# Patient Record
Sex: FEMALE | Race: WHITE | NOT HISPANIC OR LATINO | Employment: FULL TIME | URBAN - NONMETROPOLITAN AREA
[De-identification: names, ages, dates, MRNs, and addresses within clinical notes are randomized per-mention and may not be internally consistent; named-entity substitution may affect disease eponyms.]

---

## 2023-03-16 ENCOUNTER — TELEMEDICINE (OUTPATIENT)
Dept: PRIMARY CARE | Facility: CLINIC | Age: 58
End: 2023-03-16
Payer: COMMERCIAL

## 2023-03-16 ENCOUNTER — TELEPHONE (OUTPATIENT)
Dept: PRIMARY CARE | Facility: CLINIC | Age: 58
End: 2023-03-16

## 2023-03-16 DIAGNOSIS — U07.1 COVID-19 VIRUS DETECTED: Primary | ICD-10-CM

## 2023-03-16 DIAGNOSIS — M79.605 LEG PAIN, BILATERAL: ICD-10-CM

## 2023-03-16 DIAGNOSIS — M79.604 LEG PAIN, BILATERAL: ICD-10-CM

## 2023-03-16 DIAGNOSIS — J45.30 MILD PERSISTENT ASTHMA WITHOUT COMPLICATION (HHS-HCC): ICD-10-CM

## 2023-03-16 PROBLEM — E61.1 IRON DEFICIENCY: Status: ACTIVE | Noted: 2023-03-16

## 2023-03-16 PROBLEM — Z86.59 HISTORY OF CLAUSTROPHOBIA: Status: ACTIVE | Noted: 2023-03-16

## 2023-03-16 PROBLEM — E78.00 ELEVATED LDL CHOLESTEROL LEVEL: Status: ACTIVE | Noted: 2023-03-16

## 2023-03-16 PROBLEM — E53.8 VITAMIN B12 DEFICIENCY: Status: ACTIVE | Noted: 2023-03-16

## 2023-03-16 PROBLEM — R03.0 ELEVATED BLOOD PRESSURE READING: Status: ACTIVE | Noted: 2023-03-16

## 2023-03-16 PROBLEM — J45.909 ASTHMA (HHS-HCC): Status: ACTIVE | Noted: 2023-03-16

## 2023-03-16 PROBLEM — F41.9 ANXIETY: Status: ACTIVE | Noted: 2023-03-16

## 2023-03-16 PROBLEM — K20.0 EOSINOPHILIC ESOPHAGITIS: Status: ACTIVE | Noted: 2023-03-16

## 2023-03-16 PROBLEM — J30.9 ALLERGIC RHINITIS: Status: ACTIVE | Noted: 2023-03-16

## 2023-03-16 PROBLEM — Z86.718 HISTORY OF DVT (DEEP VEIN THROMBOSIS): Status: ACTIVE | Noted: 2023-03-16

## 2023-03-16 PROBLEM — R00.2 PALPITATIONS: Status: ACTIVE | Noted: 2023-03-16

## 2023-03-16 PROBLEM — K21.9 GERD (GASTROESOPHAGEAL REFLUX DISEASE): Status: ACTIVE | Noted: 2023-03-16

## 2023-03-16 PROBLEM — H54.61 VISION LOSS OF RIGHT EYE: Status: ACTIVE | Noted: 2023-03-16

## 2023-03-16 PROBLEM — G43.909 MIGRAINE HEADACHE: Status: ACTIVE | Noted: 2023-03-16

## 2023-03-16 PROBLEM — R76.8 POSITIVE ANA (ANTINUCLEAR ANTIBODY): Status: ACTIVE | Noted: 2023-03-16

## 2023-03-16 PROBLEM — R00.1 BRADYCARDIA: Status: ACTIVE | Noted: 2023-03-16

## 2023-03-16 PROCEDURE — 99213 OFFICE O/P EST LOW 20 MIN: CPT | Performed by: FAMILY MEDICINE

## 2023-03-16 RX ORDER — HYDROXYZINE HYDROCHLORIDE 10 MG/1
10 TABLET, FILM COATED ORAL
COMMUNITY
Start: 2018-08-28

## 2023-03-16 RX ORDER — OMEPRAZOLE 20 MG/1
20 CAPSULE, DELAYED RELEASE ORAL
COMMUNITY
Start: 2022-03-23 | End: 2023-04-05 | Stop reason: ALTCHOICE

## 2023-03-16 RX ORDER — IBUPROFEN 600 MG/1
200 TABLET ORAL
COMMUNITY
Start: 2022-12-09

## 2023-03-16 RX ORDER — FLUTICASONE FUROATE AND VILANTEROL TRIFENATATE 100; 25 UG/1; UG/1
1 POWDER RESPIRATORY (INHALATION) DAILY
Qty: 3 EACH | Refills: 1 | Status: SHIPPED
Start: 2023-03-16 | End: 2023-07-29

## 2023-03-16 RX ORDER — CHOLECALCIFEROL (VITAMIN D3) 50 MCG
2000 TABLET ORAL DAILY
COMMUNITY

## 2023-03-16 RX ORDER — FLUTICASONE FUROATE AND VILANTEROL TRIFENATATE 100; 25 UG/1; UG/1
1 POWDER RESPIRATORY (INHALATION) DAILY
COMMUNITY
Start: 2022-03-23 | End: 2023-03-16 | Stop reason: SDUPTHER

## 2023-03-16 ASSESSMENT — PATIENT HEALTH QUESTIONNAIRE - PHQ9
1. LITTLE INTEREST OR PLEASURE IN DOING THINGS: NOT AT ALL
SUM OF ALL RESPONSES TO PHQ9 QUESTIONS 1 AND 2: 0
2. FEELING DOWN, DEPRESSED OR HOPELESS: NOT AT ALL

## 2023-03-16 NOTE — TELEPHONE ENCOUNTER
Onset of Covid illness- 3/12  Date & Location of positive covid 19 test-3/12  Home   Vaccinated for covid 19- no  Current Symptoms  - leg pain, ha,     I have asked patient and they report they have NO increased SOB, No CP, No confusion, No change in skin color ( blue or gray).    Provider if you can please review patient's chart and medical history and provide detailed instructions on next steps in this task.   Thank you     Basically, she just wants to touch base with you for advise to make sure she is doing what she needs to do.    Refill on Breo for her asthma

## 2023-03-16 NOTE — TELEPHONE ENCOUNTER
Please schedule patient this afternoon on my schedule as a VV, need to ask more questions regarding leg pain

## 2023-03-16 NOTE — PROGRESS NOTES
Subjective   Patient ID: Elda Anne is a 57 y.o. female who presents for Leg Pain and Covid-19 Home Monitoring Phone Call.    HPI   Patient tested positive for Covid on 3/13/23 (2nd occurrence, last was 2 years ago)  Symptoms started on 3/12/23 evening with bilateral leg aching and fever/chills.  Highest temp was 100.7F, no fever the last 24 hours.  +h/o asthma and h/o DVT at age 38 when traveling in [place] (does not routinely take a blood thinner)  Took 3 home tests and all 3 were positive  Took Tylenol and it alleviated her leg pain, pain is located from mid thigh to right below knees.   Pain is better than it was last night  Denies chest pain or shortness of breath (does use Breo for asthma)  Denies leg redness, swelling or warmth, NTTP  She states she has been mobile and moving around the house, not laying bed all day  Has never taken Paxlovid      Review of Systems  See HPI    Objective   There were no vitals taken for this visit.  Unable to obtain VS during this visit    Physical Exam  Constitutional: Well developed, well nourished, alert and in no acute distress   Eyes: Normal external exam.   Pulmonary: No respiratory distress, speaks comfortably in complete sentences.  Neurologic: Cranial nerves II-XII grossly intact. Answers questions appropriately.   Psychiatric: Mood calm and affect normal.    Assessment/Plan   We discussed supportive care at this time: hydration, moving around the house, nasal saline rinses 2-3x/day, intranasal Flonase    May take Tylenol 1000mg WITH Ibuprofen 800mg every 8 hours together as needed for jose luis, up to 5 days. Take with food.    We discussed the benefits, risks and potential adverse effects of Paxlovid.    Please closely  monitor your leg pain, if your pain worsens, you develop swelling, redness, warmth, chest pain or shortness of breath, please proceed to the ED immediately.

## 2023-04-05 ENCOUNTER — OFFICE VISIT (OUTPATIENT)
Dept: PRIMARY CARE | Facility: CLINIC | Age: 58
End: 2023-04-05
Payer: COMMERCIAL

## 2023-04-05 VITALS
OXYGEN SATURATION: 98 % | RESPIRATION RATE: 14 BRPM | SYSTOLIC BLOOD PRESSURE: 156 MMHG | DIASTOLIC BLOOD PRESSURE: 68 MMHG | HEART RATE: 55 BPM | WEIGHT: 164.9 LBS | TEMPERATURE: 98 F | HEIGHT: 67 IN | BODY MASS INDEX: 25.88 KG/M2

## 2023-04-05 DIAGNOSIS — M79.605 LEG PAIN, POSTERIOR, LEFT: Primary | ICD-10-CM

## 2023-04-05 DIAGNOSIS — E66.3 OVERWEIGHT WITH BODY MASS INDEX (BMI) 25.0-29.9: ICD-10-CM

## 2023-04-05 DIAGNOSIS — R03.0 ELEVATED BLOOD PRESSURE READING: ICD-10-CM

## 2023-04-05 PROCEDURE — 99213 OFFICE O/P EST LOW 20 MIN: CPT | Performed by: FAMILY MEDICINE

## 2023-04-05 PROCEDURE — 1036F TOBACCO NON-USER: CPT | Performed by: FAMILY MEDICINE

## 2023-04-05 ASSESSMENT — PATIENT HEALTH QUESTIONNAIRE - PHQ9
2. FEELING DOWN, DEPRESSED OR HOPELESS: NOT AT ALL
1. LITTLE INTEREST OR PLEASURE IN DOING THINGS: NOT AT ALL
SUM OF ALL RESPONSES TO PHQ9 QUESTIONS 1 AND 2: 0

## 2023-04-05 NOTE — PROGRESS NOTES
"Subjective   Patient ID: Elda Anne is a 57 y.o. female who presents for Foot Pain (Left foot x4-5 days. No known injury. ).    HPI   Since Saturday, she can't put pressure on her left foot  Pain is located on lateral dorsal foot  Had cramping on plantar aspect of foot  Denies swelling  Has h/o DVT in LLE (has permanent s/s)  She does have pain in posterior left knee  No GRACIE  Recently had covid  No change in activity  She is not currently taking blood thinners  Having difficulty ambulating due to pain  She did take off work yesterday and elevated/rested her foot/leg and is somewhat improved today   No recent travel  No redness or warmth to the leg, but never had this with initial clot  Started wearing compression stockings again the last few days    Review of Systems  See HPI    Objective   /69 (BP Location: Left arm, Patient Position: Sitting, BP Cuff Size: Adult)   Pulse 55   Temp 36.7 °C (98 °F) (Temporal)   Resp 14   Ht 1.702 m (5' 7\")   Wt 74.8 kg (164 lb 14.5 oz)   SpO2 98%   BMI 25.83 kg/m²     Physical Exam  Constitutional: Well developed, well nourished, alert and in no acute distress   Eyes: Normal external exam.   Cardiovascular: Regular rate and rhythm, normal S1 and S2, no murmurs, gallops, or rubs. Radial pulses normal. No peripheral edema.  Pulmonary: No respiratory distress, lungs clear to auscultation bilaterally. No wheezes, rhonchi, rales.  Skin: Warm, well perfused, normal skin turgor and color. No rashes or discoloration.  Neurologic: Cranial nerves II-XII grossly intact.   Psychiatric: Mood calm and affect normal.  LLE: negative calf squeeze, but pain is located posteriorlaterally, worse with leg extension, no edema/erythema or warmth  Left Foot: Mild discomfort with resisted plantarflexion, none to resisted dorsiflexion, no ecchymosis, NTTP but pain is located on lateral dorsal aspect of foot and around lateral malleolus, no edema. Normal ankle ROM.   Patient ambulated " cautiously and with a limp    Assessment/Plan   STAT venous US LLE to r/o DVT-we will contact you with results    Work excuse provided    Please rest and ice your foot, ice for 15 minutes 2-3x/day, may need MRI in future or to wear a boot for comfort the next few days.  Avoid high impact or strenuous activity over the next 7 days.  Consider taking an NSAID OTC as directed on the bottle for pain/inflammation.

## 2023-04-05 NOTE — LETTER
April 5, 2023     Patient: Elda Anne   YOB: 1965   Date of Visit: 4/5/2023       To Whom It May Concern:    Elda Anne was seen in my clinic on 4/5/2023 at 10:00 am. Please excuse Elda for her absence from work on this day to make the appointment and tomorrow, 4/6/2023.    If you have any questions or concerns, please don't hesitate to call.         Sincerely,         Sole Womack,         CC: No Recipients

## 2023-04-14 ENCOUNTER — LAB (OUTPATIENT)
Dept: LAB | Facility: LAB | Age: 58
End: 2023-04-14
Payer: COMMERCIAL

## 2023-04-14 DIAGNOSIS — M25.572 ACUTE LEFT ANKLE PAIN: ICD-10-CM

## 2023-04-14 DIAGNOSIS — M79.672 LEFT FOOT PAIN: ICD-10-CM

## 2023-04-14 DIAGNOSIS — M79.672 LEFT FOOT PAIN: Primary | ICD-10-CM

## 2023-04-14 PROCEDURE — 36415 COLL VENOUS BLD VENIPUNCTURE: CPT

## 2023-04-14 PROCEDURE — 85652 RBC SED RATE AUTOMATED: CPT

## 2023-04-14 PROCEDURE — 84550 ASSAY OF BLOOD/URIC ACID: CPT

## 2023-04-15 DIAGNOSIS — M79.605 LEG PAIN, POSTERIOR, LEFT: ICD-10-CM

## 2023-04-15 DIAGNOSIS — R03.0 ELEVATED BLOOD PRESSURE READING: Primary | ICD-10-CM

## 2023-04-15 DIAGNOSIS — M79.672 FOOT PAIN, LEFT: ICD-10-CM

## 2023-04-15 LAB
SEDIMENTATION RATE, ERYTHROCYTE: 27 MM/H (ref 0–30)
URATE (MG/DL) IN SER/PLAS: 5 MG/DL (ref 2.3–6.7)

## 2023-06-20 ENCOUNTER — OFFICE (OUTPATIENT)
Dept: URBAN - METROPOLITAN AREA CLINIC 27 | Facility: CLINIC | Age: 58
End: 2023-06-20

## 2023-06-20 VITALS
WEIGHT: 170 LBS | SYSTOLIC BLOOD PRESSURE: 150 MMHG | HEIGHT: 67 IN | DIASTOLIC BLOOD PRESSURE: 63 MMHG | HEART RATE: 55 BPM | TEMPERATURE: 97 F

## 2023-06-20 DIAGNOSIS — K21.9 GASTRO-ESOPHAGEAL REFLUX DISEASE WITHOUT ESOPHAGITIS: ICD-10-CM

## 2023-06-20 DIAGNOSIS — Z12.11 ENCOUNTER FOR SCREENING FOR MALIGNANT NEOPLASM OF COLON: ICD-10-CM

## 2023-06-20 DIAGNOSIS — R13.10 DYSPHAGIA, UNSPECIFIED: ICD-10-CM

## 2023-06-20 PROCEDURE — 99204 OFFICE O/P NEW MOD 45 MIN: CPT | Performed by: INTERNAL MEDICINE

## 2023-07-28 NOTE — PROGRESS NOTES
Subjective   Patient ID: Elda Anne is a 58 y.o. female who presents for discuss asthma treatment (Breo- 1 time a day /Albuterol- 4-5 times a week, 1 puff at a time ) and Arm Pain (Excruciating, burning pain,).    HPI     Patient of Sole Womack DO    Patient here for evaluation of asthma.  Currently prescribe Breo Ellipta.    Former smoker and quit about 35 years ago.   Smoked an occasional cigarette during teenage years to early twenties.  Smoked for about 5 years.   No personal history of cancer.   Father had lung cancer.      10/2022 CACS showed calcified granulomata within the left lower lobe and left josseline. No gross evidence of mediastinal or hilar lymphadenopathy or masses is identified. The visualized segments of the lungs are normally  expanded.    Saw [facility] on 12/2022 and was recommended she have CT chest and pulmonology evaluation. CT chest ordered but do not see this was completed.  Pulmonology referral placed 12/2022    No prior PFTs in our records.        Review of Systems   All other systems reviewed and are negative.      Objective   /72 (BP Location: Right arm, Patient Position: Sitting, BP Cuff Size: Small adult)   Pulse 53   Temp 36.7 °C (98.1 °F) (Temporal)   Resp 14   Wt 78.7 kg (173 lb 9.6 oz)   SpO2 97%   BMI 27.19 kg/m²     Physical Exam  Vitals and nursing note reviewed.   Constitutional:       General: She is not in acute distress.     Appearance: Normal appearance. She is not toxic-appearing.   HENT:      Head: Normocephalic and atraumatic.   Eyes:      Extraocular Movements: Extraocular movements intact.      Pupils: Pupils are equal, round, and reactive to light.   Neck:      Thyroid: No thyromegaly.      Vascular: No hepatojugular reflux or JVD.   Cardiovascular:      Rate and Rhythm: Normal rate and regular rhythm.      Heart sounds: No murmur heard.     No friction rub. No gallop.   Pulmonary:      Effort: Pulmonary effort is normal.      Breath sounds: Normal breath  sounds. No wheezing, rhonchi or rales.   Abdominal:      General: Bowel sounds are normal. There is no distension.      Palpations: Abdomen is soft. There is no mass.      Tenderness: There is no abdominal tenderness. There is no guarding.   Musculoskeletal:      Right lower leg: No edema.      Left lower leg: No edema.   Lymphadenopathy:      Cervical: No cervical adenopathy.   Skin:     General: Skin is warm and dry.   Neurological:      General: No focal deficit present.      Mental Status: She is alert and oriented to person, place, and time.   Psychiatric:         Mood and Affect: Mood normal.         Behavior: Behavior normal.         Assessment/Plan   Diagnoses and all orders for this visit:  Asthma, unspecified asthma severity, unspecified whether complicated, unspecified whether persistent  -     fluticasone propion-salmeteroL (Advair Diskus) 250-50 mcg/dose diskus inhaler; Inhale 1 puff 2 times a day. Rinse mouth with water after use to reduce aftertaste and incidence of candidiasis. Do not swallow.  -     Pulmonary function testing (Ancillary Performed); Future  -     Referral to Pulmonology; Future  -     montelukast (Singulair) 10 mg tablet; Take 1 tablet (10 mg) by mouth once daily at bedtime.  Elevated blood pressure reading  Allergic rhinitis, unspecified seasonality, unspecified trigger      Follow-up in  6 - 8 weeks with me for recheck bp,   review pulmonary function test      Follow up left arm pain next avail acute     Please make wellness exam with Dr Womack in Oct/ Nov, will need labs before that to review       Follow-up in * for routine care.  Call for sooner follow-up if needed.

## 2023-07-29 ENCOUNTER — OFFICE VISIT (OUTPATIENT)
Dept: PRIMARY CARE | Facility: CLINIC | Age: 58
End: 2023-07-29
Payer: COMMERCIAL

## 2023-07-29 VITALS
TEMPERATURE: 98.1 F | HEART RATE: 53 BPM | OXYGEN SATURATION: 97 % | RESPIRATION RATE: 14 BRPM | DIASTOLIC BLOOD PRESSURE: 72 MMHG | BODY MASS INDEX: 27.19 KG/M2 | WEIGHT: 173.6 LBS | SYSTOLIC BLOOD PRESSURE: 150 MMHG

## 2023-07-29 DIAGNOSIS — R76.8 POSITIVE ANA (ANTINUCLEAR ANTIBODY): ICD-10-CM

## 2023-07-29 DIAGNOSIS — J45.909 ASTHMA, UNSPECIFIED ASTHMA SEVERITY, UNSPECIFIED WHETHER COMPLICATED, UNSPECIFIED WHETHER PERSISTENT (HHS-HCC): Primary | ICD-10-CM

## 2023-07-29 DIAGNOSIS — J30.9 ALLERGIC RHINITIS, UNSPECIFIED SEASONALITY, UNSPECIFIED TRIGGER: ICD-10-CM

## 2023-07-29 DIAGNOSIS — R03.0 ELEVATED BLOOD PRESSURE READING: ICD-10-CM

## 2023-07-29 PROCEDURE — 99214 OFFICE O/P EST MOD 30 MIN: CPT | Performed by: FAMILY MEDICINE

## 2023-07-29 PROCEDURE — 1036F TOBACCO NON-USER: CPT | Performed by: FAMILY MEDICINE

## 2023-07-29 RX ORDER — ALBUTEROL SULFATE 90 UG/1
2 AEROSOL, METERED RESPIRATORY (INHALATION) 4 TIMES DAILY
COMMUNITY
Start: 2016-05-27 | End: 2023-10-03

## 2023-07-29 RX ORDER — FLUTICASONE PROPIONATE 50 MCG
2 SPRAY, SUSPENSION (ML) NASAL
COMMUNITY
Start: 2016-05-27

## 2023-07-29 RX ORDER — MONTELUKAST SODIUM 10 MG/1
10 TABLET ORAL NIGHTLY
Qty: 30 TABLET | Refills: 5 | Status: SHIPPED
Start: 2023-07-29 | End: 2023-10-31 | Stop reason: ALTCHOICE

## 2023-07-29 RX ORDER — FLUTICASONE PROPIONATE AND SALMETEROL 250; 50 UG/1; UG/1
1 POWDER RESPIRATORY (INHALATION)
Qty: 60 EACH | Refills: 11 | Status: SHIPPED
Start: 2023-07-29 | End: 2023-11-04 | Stop reason: WASHOUT

## 2023-07-29 NOTE — ASSESSMENT & PLAN NOTE
Patient is encouraged to adopt a low sodium diet (Dietary Approach to Stop Hypertension, or DASH diet) .     We recommend limitation of refined carbohydrates such as pasta, pretzels, breads, , sugar -sweetened foods or beverages, alcohol, pastries, cereals, or bagels.   We do recommend eating lots of vegetables- 5 -7 servings of veggies daily. Eat lean proteins, and some fruits.  Eat small amounts of healthy fat daily, such as a handful of almonds, walnuts, pumpkin seeds, a serving of salmon, a splash of olive oil on your salad, an avocado.       Healthy nutritional choices decrease conditions like elevated cholesterol, elevated sugars, elevated weight, elevated blood pressure, elevated inflammation.    Healthy choices can also lower risk of events such as strokes, heart attacks, and cancer.     Make most of your food intake plant- based.   Have occasional treats if you like, but try not to overindulge.     Some sort of heart-rate increasing movement or exercise is recommended 4 - 6 days per week, even if in 5 or 10 min increments  several times throughout the day.   Do this when your breathing allows, follow up with pulm to maximize lung function     If patient wishes to try a natural approach to lowering blood pressure, can consider:  -whey protein 20 -30 g daily (hydrolyzed is best, but any is ok)  -aged garlic 600 mg twice daily (Kyolic brand aged garlic on line is one example)   -CoQ10 supplement at 120 mg - 360 mg daily (average dose studied 225mg daily).

## 2023-07-31 ENCOUNTER — TELEPHONE (OUTPATIENT)
Dept: PRIMARY CARE | Facility: CLINIC | Age: 58
End: 2023-07-31
Payer: COMMERCIAL

## 2023-07-31 NOTE — TELEPHONE ENCOUNTER
----- Message from Arianna Fernandez CMA sent at 7/26/2023 10:37 AM EDT -----  Regarding: FW: Asthma management question  Contact: [phone]    ----- Message -----  From: Elda Anne  Sent: 7/26/2023  10:24 AM EDT  To: [facility] Clinical Support Staff  Subject: Asthma management question                       Hi..  Can someone there help me with my worsening asthma or do I need to make an appointment with my specialist?  I wasn't sure if Dr. Womack was even back from maternity leave or what the time frame on that was.    I'd possibly like a different long term inhaler.  I requested Breo but it's too expensive and isn't super effective for me.    Please advise.    Thank you!  Elda Anne

## 2023-09-30 ENCOUNTER — APPOINTMENT (OUTPATIENT)
Dept: PRIMARY CARE | Facility: CLINIC | Age: 58
End: 2023-09-30
Payer: COMMERCIAL

## 2023-10-31 ENCOUNTER — OFFICE VISIT (OUTPATIENT)
Dept: PRIMARY CARE | Facility: CLINIC | Age: 58
End: 2023-10-31
Payer: COMMERCIAL

## 2023-10-31 VITALS
TEMPERATURE: 99 F | WEIGHT: 173.7 LBS | DIASTOLIC BLOOD PRESSURE: 68 MMHG | BODY MASS INDEX: 27.26 KG/M2 | HEART RATE: 57 BPM | HEIGHT: 67 IN | SYSTOLIC BLOOD PRESSURE: 140 MMHG | OXYGEN SATURATION: 95 % | RESPIRATION RATE: 14 BRPM

## 2023-10-31 DIAGNOSIS — E55.9 VITAMIN D DEFICIENCY: ICD-10-CM

## 2023-10-31 DIAGNOSIS — Z00.00 ENCOUNTER FOR WELLNESS EXAMINATION IN ADULT: Primary | ICD-10-CM

## 2023-10-31 DIAGNOSIS — Z78.0 POSTMENOPAUSE: ICD-10-CM

## 2023-10-31 DIAGNOSIS — I10 BENIGN ESSENTIAL HYPERTENSION: Chronic | ICD-10-CM

## 2023-10-31 DIAGNOSIS — J45.20 MILD INTERMITTENT ASTHMA WITHOUT COMPLICATION (HHS-HCC): ICD-10-CM

## 2023-10-31 DIAGNOSIS — E66.3 OVERWEIGHT WITH BODY MASS INDEX (BMI) OF 27 TO 27.9 IN ADULT: ICD-10-CM

## 2023-10-31 DIAGNOSIS — J45.909 ASTHMA, UNSPECIFIED ASTHMA SEVERITY, UNSPECIFIED WHETHER COMPLICATED, UNSPECIFIED WHETHER PERSISTENT (HHS-HCC): ICD-10-CM

## 2023-10-31 DIAGNOSIS — R53.82 CHRONIC FATIGUE: ICD-10-CM

## 2023-10-31 DIAGNOSIS — Z12.31 SCREENING MAMMOGRAM FOR BREAST CANCER: ICD-10-CM

## 2023-10-31 DIAGNOSIS — Z12.11 SCREENING FOR COLON CANCER: ICD-10-CM

## 2023-10-31 DIAGNOSIS — L91.8 SKIN TAGS, MULTIPLE ACQUIRED: ICD-10-CM

## 2023-10-31 PROBLEM — R03.0 ELEVATED BLOOD PRESSURE READING: Status: RESOLVED | Noted: 2023-03-16 | Resolved: 2023-10-31

## 2023-10-31 PROBLEM — R00.2 PALPITATIONS: Status: RESOLVED | Noted: 2023-03-16 | Resolved: 2023-10-31

## 2023-10-31 PROBLEM — R00.1 BRADYCARDIA: Status: RESOLVED | Noted: 2023-03-16 | Resolved: 2023-10-31

## 2023-10-31 PROCEDURE — 99213 OFFICE O/P EST LOW 20 MIN: CPT | Performed by: FAMILY MEDICINE

## 2023-10-31 PROCEDURE — 90686 IIV4 VACC NO PRSV 0.5 ML IM: CPT | Performed by: FAMILY MEDICINE

## 2023-10-31 PROCEDURE — 3078F DIAST BP <80 MM HG: CPT | Performed by: FAMILY MEDICINE

## 2023-10-31 PROCEDURE — 3008F BODY MASS INDEX DOCD: CPT | Performed by: FAMILY MEDICINE

## 2023-10-31 PROCEDURE — 1036F TOBACCO NON-USER: CPT | Performed by: FAMILY MEDICINE

## 2023-10-31 PROCEDURE — 3077F SYST BP >= 140 MM HG: CPT | Performed by: FAMILY MEDICINE

## 2023-10-31 PROCEDURE — 99396 PREV VISIT EST AGE 40-64: CPT | Performed by: FAMILY MEDICINE

## 2023-10-31 PROCEDURE — 90471 IMMUNIZATION ADMIN: CPT | Performed by: FAMILY MEDICINE

## 2023-10-31 RX ORDER — ALBUTEROL SULFATE 90 UG/1
2 AEROSOL, METERED RESPIRATORY (INHALATION) EVERY 4 HOURS PRN
Qty: 25.5 G | Refills: 0 | Status: SHIPPED | OUTPATIENT
Start: 2023-10-31

## 2023-10-31 ASSESSMENT — PROMIS GLOBAL HEALTH SCALE
RATE_SOCIAL_SATISFACTION: GOOD
RATE_GENERAL_HEALTH: POOR
EMOTIONAL_PROBLEMS: RARELY
RATE_QUALITY_OF_LIFE: FAIR
RATE_MENTAL_HEALTH: FAIR
RATE_AVERAGE_FATIGUE: MODERATE
CARRYOUT_SOCIAL_ACTIVITIES: VERY GOOD
RATE_PHYSICAL_HEALTH: POOR
RATE_AVERAGE_PAIN: 5
CARRYOUT_PHYSICAL_ACTIVITIES: COMPLETELY

## 2023-10-31 NOTE — PROGRESS NOTES
"Subjective   Patient ID: Elda Anne is a 58 y.o. female who presents for a wellness examination.   *due for tdap and shingrix series    HPI   Diet: lower carb, well rounded  Supplements/vitamins: intermittent omega 3, vitamin D  Alcohol use: socially  Caffeine intake: coffee, not in afternoon   Exercise: none  Sleep: no issues   Last dental appointment: routinely   Last eye appointment: yearly   Anxiety/Depression: denies   Menstrual cycles: postmenopause  Last pap smear: GYN- at   Mammogram: none found   Fmhx breast cancer: N  Cscope: Due ([facility] manages), had Cscope in past   Fmhx colon cancer: N  CT cardiac score: 10/2022  Tobacco use/Lung cancer screening: N  Recreational drug use: N  Immunizations: due for tdap and shingrix series       Asthma  Has a Pulmonology appointment with [facility] ([place]) end of November  Had an Rx Advair but has not used it, was using flovent and still using it, and needing albuterol more frequently  Triggers-unknown  Breathing was worse this past Friday, denies wheezing but has a chest tightness and difficulty with expiration  Never tried singulair    Review of Systems   All other systems reviewed and are negative.    Objective   /68 (BP Location: Left arm, Patient Position: Sitting, BP Cuff Size: Large adult)   Pulse 57   Temp 37.2 °C (99 °F)   Resp 14   Ht 1.702 m (5' 7\")   Wt 78.8 kg (173 lb 11.2 oz)   SpO2 95%   BMI 27.21 kg/m²     Physical Exam  Constitutional: Well developed, well nourished, alert and in no acute distress.  Head and Face: Normocephalic, atraumatic.  Eyes: Normal external exam. Pupils equally round and reactive to light with normal accommodation and extraocular movements intact.   ENT: External inspection of ears normal, tympanic membranes visualized and normal. Nasal mucosa, septum, and turbinates normal. Oral mucosa moist, oropharynx clear.   Neck: Supple, no lymphadenopathy or masses. Thyroid not enlarged, no " palpable nodules.   Cardiovascular: Regular rate and rhythm, normal S1 and S2, no murmurs, gallops, or rubs. Radial pulses normal. No peripheral edema. No carotid bruits.   Pulmonary: No respiratory distress, lungs clear to auscultation bilaterally. No wheezes, rhonchi, rales.   Abdomen: Soft, nontender, nondistended, normal bowel sounds. No masses palpated.   Musculoskeletal: Gait normal. Muscle strength/tone normal of all 4 extremities. Normal range of motion of all extremities.   Skin: Warm, well perfused, normal skin turgor and color, no lesions or rashes noted. +several neck skin tag.  Neurologic: Cranial nerves II-XII grossly intact. Deep tendon reflexes were 2+ and symmetric. Sensation normal bilaterally.   Psychiatric: Mood calm and affect normal.      Assessment/Plan   Recommendations for women annual wellness exam:   Make sure screenings for cervical and breast cancer are up to date if applicable- pap smears age 21-65  Discuss mammogram starting at age 40 or sooner if positive family history of breast cancer   STD screening   Follow a healthy diet (Dash diet, Mediterranean diet)  Exercise 150 min/wk   Maintain healthy weight (BMI < 25)-your BMI is 27.  Do not smoke   Alcohol in moderation (up to 1 drink/day)  Get enough sleep (7-8 hours/night)  Take a prenatal vitamin with folic acid if possibility of pregnancy   Make sure immunizations are up to date (influenza, Tdap)-due for Tdap and shingrix series.  Premenopausal women need minimum 1,000 mg calcium and 600-800 IU vitamin D daily (combination of diet + supplement)  Talk to your physician if you have concerns about depression or anxiety  Visit dentist twice yearly  Colon Cancer Screening-obtain records     Pending Pulmonology appt with [facility]    Labs ordered (fasting)    HTN  Hypertension (High Blood Pressure)  -slightly elevated  -follow a low sodium diet  -limit stress, alcohol, tobacco and caffeine as much as possible    It is important to control Blood  Pressure for reducing risk of stroke, heart attack, kidney damage, and circulatory problems from clogged arteries.  Advised new blood pressure goals based on evidence from recent studies showed blood pressure should be less than 130/80.   Check your BP at least weekly and If your BP is above this goal on repeated measurements, please contact us so we can make treatment adjustments.  Diet recommendations - reduce sodium intake (less than 2,400 mg/day), follow DASH diet, increase exercise (150 minutes per week), lose weight (Goal BMI < 25).   Generally recommend follow up at least every 6 months.    If you are less than 60 years old, have diabetes mellitus, or chronic kidney disease, your goal blood pressure is < 140/90.  If you are older than 60 years old and do not have diabetes or kidney disease, your goal blood pressure is < 150/90.    Referral to Dermatology -       Follow up in 4 weeks for blood pressure

## 2023-11-01 ENCOUNTER — OFFICE VISIT (OUTPATIENT)
Dept: DERMATOLOGY | Facility: CLINIC | Age: 58
End: 2023-11-01
Payer: COMMERCIAL

## 2023-11-01 DIAGNOSIS — L91.8 SKIN TAG: ICD-10-CM

## 2023-11-01 DIAGNOSIS — D48.5 NEOPLASM OF UNCERTAIN BEHAVIOR OF SKIN: Primary | ICD-10-CM

## 2023-11-01 DIAGNOSIS — L72.0 EPIDERMAL INCLUSION CYST: ICD-10-CM

## 2023-11-01 DIAGNOSIS — L82.1 SEBORRHEIC KERATOSIS: ICD-10-CM

## 2023-11-01 PROCEDURE — 1036F TOBACCO NON-USER: CPT | Performed by: DERMATOLOGY

## 2023-11-01 PROCEDURE — 88305 TISSUE EXAM BY PATHOLOGIST: CPT | Mod: TC,DER | Performed by: DERMATOLOGY

## 2023-11-01 PROCEDURE — 99203 OFFICE O/P NEW LOW 30 MIN: CPT | Performed by: DERMATOLOGY

## 2023-11-01 PROCEDURE — 88305 TISSUE EXAM BY PATHOLOGIST: CPT | Performed by: DERMATOLOGY

## 2023-11-01 PROCEDURE — 11301 SHAVE SKIN LESION 0.6-1.0 CM: CPT | Performed by: DERMATOLOGY

## 2023-11-01 PROCEDURE — 3008F BODY MASS INDEX DOCD: CPT | Performed by: DERMATOLOGY

## 2023-11-01 RX ORDER — FLUTICASONE PROPIONATE 44 UG/1
1 AEROSOL, METERED RESPIRATORY (INHALATION)
COMMUNITY
End: 2024-04-04 | Stop reason: WASHOUT

## 2023-11-01 ASSESSMENT — DERMATOLOGY QUALITY OF LIFE (QOL) ASSESSMENT
RATE HOW BOTHERED YOU ARE BY SYMPTOMS OF YOUR SKIN PROBLEM (EG, ITCHING, STINGING BURNING, HURTING OR SKIN IRRITATION): 3
RATE HOW EMOTIONALLY BOTHERED YOU ARE BY YOUR SKIN PROBLEM (FOR EXAMPLE, WORRY, EMBARRASSMENT, FRUSTRATION): 6 - ALWAYS BOTHERED
RATE HOW EMOTIONALLY BOTHERED YOU ARE BY YOUR SKIN PROBLEM (FOR EXAMPLE, WORRY, EMBARRASSMENT, FRUSTRATION): 6 - ALWAYS BOTHERED
RATE HOW BOTHERED YOU ARE BY EFFECTS OF YOUR SKIN PROBLEMS ON YOUR ACTIVITIES (EG, GOING OUT, ACCOMPLISHING WHAT YOU WANT, WORK ACTIVITIES OR YOUR RELATIONSHIPS WITH OTHERS): 2
RATE HOW BOTHERED YOU ARE BY EFFECTS OF YOUR SKIN PROBLEMS ON YOUR ACTIVITIES (EG, GOING OUT, ACCOMPLISHING WHAT YOU WANT, WORK ACTIVITIES OR YOUR RELATIONSHIPS WITH OTHERS): 2
RATE HOW BOTHERED YOU ARE BY SYMPTOMS OF YOUR SKIN PROBLEM (EG, ITCHING, STINGING BURNING, HURTING OR SKIN IRRITATION): 3

## 2023-11-01 NOTE — PROGRESS NOTES
Subjective     Elda Anne is a 58 y.o. female who presents for the following: Suspicious Skin Lesion (Neck, right temple lesions- pt denies any history of skin cancer. ) and skin tags (Neck, back, under breasts).     Review of Systems:  No other skin or systemic complaints other than what is documented elsewhere in the note.    The following portions of the chart were reviewed this encounter and updated as appropriate:   Tobacco  Allergies  Meds  Problems  Med Hx  Surg Hx  Fam Hx         Skin Cancer History  No skin cancer on file.      Specialty Problems    None       Objective   Well appearing patient in no apparent distress; mood and affect are within normal limits.    A focused skin examination was performed. All findings within normal limits unless otherwise noted below.    Assessment/Plan   1. Neoplasm of uncertain behavior of skin  Left Upper Back  8mm hyperkeratotic papule              Shave removal    Lesion diameter (cm):  0.8  Informed consent: discussed and consent obtained    Timeout: patient name, date of birth, surgical site, and procedure verified    Procedure prep:  Patient was prepped and draped  Anesthesia: the lesion was anesthetized in a standard fashion    Anesthetic:  1% lidocaine w/ epinephrine 1-100,000 local infiltration  Instrument used: DermaBlade    Hemostasis achieved with: aluminum chloride    Outcome: patient tolerated procedure well    Post-procedure details: sterile dressing applied and wound care instructions given    Dressing type: bandage and petrolatum      Staff Communication: Dermatology Local Anesthesia: Site Location: L upper back 1 % Lidocaine / Epinephrine - Amount: 0.5cc    Specimen 1 - Dermatopathology- DERM LAB  Differential Diagnosis: r/o SCC v verrucoid keratosis  Check Margins Yes/No?:    Comments:    Dermpath Lab: Routine Histopathology (formalin-fixed tissue)    2. Skin tag  Neck - Anterior  Flesh colored pedunculated papule(s) circumferential neck; there  is no clinically evident irritation or inflammation    -Discussed nature of the condition  -Reassurance  -Removal may be performed for an out of pocket fee given cosmetic nature of removal    3. Seborrheic keratosis (2)  Neck - Anterior, Right Zygomatic Area  Stuck on, waxy macule(s)/papule(s)/plaque(s) with comedo-like openings and milia like cysts    (lesions of patient's concern)  -Discussed the nature of the diagnosis  -Reassurance, recommend continued observation  -Discussed out of pocket fee for cosmetic cryotherapy    4. Epidermal inclusion cyst  Left Anterior Neck  Subcutaneous nodule(s) with normal-appearing overlying skin and connecting pore    -Discussed nature of the condition  -Reassurance, recommend observation at this time  -Patient requests excision. Discussed will send referral to Mohs surgeon and prior authorization will be completed.     Related Procedures  Referral to Dermatology - Mohs Surgery        Follow up as needed; patient declines FSE  Discussed if there are any changes or development of concerning symptoms (lesion/skin condition is changing, bleeding, enlarging, or worsening) the patient is to contact my office. The patient verbalizes understanding.    Angie Sanchez MD  11/1/2023

## 2023-11-03 ENCOUNTER — LAB (OUTPATIENT)
Dept: LAB | Facility: LAB | Age: 58
End: 2023-11-03
Payer: COMMERCIAL

## 2023-11-03 DIAGNOSIS — Z00.00 ENCOUNTER FOR WELLNESS EXAMINATION IN ADULT: ICD-10-CM

## 2023-11-03 DIAGNOSIS — R53.82 CHRONIC FATIGUE: ICD-10-CM

## 2023-11-03 DIAGNOSIS — E55.9 VITAMIN D DEFICIENCY: ICD-10-CM

## 2023-11-03 DIAGNOSIS — E53.8 VITAMIN B12 DEFICIENCY: Primary | ICD-10-CM

## 2023-11-03 LAB
25(OH)D3 SERPL-MCNC: 20 NG/ML (ref 30–100)
ALBUMIN SERPL BCP-MCNC: 4.3 G/DL (ref 3.4–5)
ALP SERPL-CCNC: 97 U/L (ref 33–110)
ALT SERPL W P-5'-P-CCNC: 17 U/L (ref 7–45)
ANION GAP SERPL CALC-SCNC: 13 MMOL/L (ref 10–20)
AST SERPL W P-5'-P-CCNC: 17 U/L (ref 9–39)
BILIRUB SERPL-MCNC: 0.7 MG/DL (ref 0–1.2)
BUN SERPL-MCNC: 15 MG/DL (ref 6–23)
CALCIUM SERPL-MCNC: 10 MG/DL (ref 8.6–10.6)
CHLORIDE SERPL-SCNC: 107 MMOL/L (ref 98–107)
CHOLEST SERPL-MCNC: 159 MG/DL (ref 0–199)
CHOLESTEROL/HDL RATIO: 2.7
CO2 SERPL-SCNC: 27 MMOL/L (ref 21–32)
CREAT SERPL-MCNC: 0.86 MG/DL (ref 0.5–1.05)
ERYTHROCYTE [DISTWIDTH] IN BLOOD BY AUTOMATED COUNT: 12.8 % (ref 11.5–14.5)
EST. AVERAGE GLUCOSE BLD GHB EST-MCNC: 114 MG/DL
GFR SERPL CREATININE-BSD FRML MDRD: 78 ML/MIN/1.73M*2
GLUCOSE SERPL-MCNC: 91 MG/DL (ref 74–99)
HBA1C MFR BLD: 5.6 %
HCT VFR BLD AUTO: 43.3 % (ref 36–46)
HDLC SERPL-MCNC: 58.2 MG/DL
HGB BLD-MCNC: 13.9 G/DL (ref 12–16)
LABORATORY COMMENT REPORT: NORMAL
LDLC SERPL CALC-MCNC: 83 MG/DL
MAGNESIUM SERPL-MCNC: 2.52 MG/DL (ref 1.6–2.4)
MCH RBC QN AUTO: 28.4 PG (ref 26–34)
MCHC RBC AUTO-ENTMCNC: 32.1 G/DL (ref 32–36)
MCV RBC AUTO: 89 FL (ref 80–100)
NON HDL CHOLESTEROL: 101 MG/DL (ref 0–149)
NRBC BLD-RTO: 0 /100 WBCS (ref 0–0)
PATH REPORT.FINAL DX SPEC: NORMAL
PATH REPORT.GROSS SPEC: NORMAL
PATH REPORT.RELEVANT HX SPEC: NORMAL
PATH REPORT.TOTAL CANCER: NORMAL
PLATELET # BLD AUTO: 326 X10*3/UL (ref 150–450)
POTASSIUM SERPL-SCNC: 4.7 MMOL/L (ref 3.5–5.3)
PROT SERPL-MCNC: 7.2 G/DL (ref 6.4–8.2)
RBC # BLD AUTO: 4.89 X10*6/UL (ref 4–5.2)
SODIUM SERPL-SCNC: 142 MMOL/L (ref 136–145)
TRIGL SERPL-MCNC: 88 MG/DL (ref 0–149)
TSH SERPL-ACNC: 2.38 MIU/L (ref 0.44–3.98)
VIT B12 SERPL-MCNC: 285 PG/ML (ref 211–911)
VLDL: 18 MG/DL (ref 0–40)
WBC # BLD AUTO: 6.1 X10*3/UL (ref 4.4–11.3)

## 2023-11-03 PROCEDURE — 83735 ASSAY OF MAGNESIUM: CPT

## 2023-11-03 PROCEDURE — 85027 COMPLETE CBC AUTOMATED: CPT

## 2023-11-03 PROCEDURE — 80061 LIPID PANEL: CPT

## 2023-11-03 PROCEDURE — 84443 ASSAY THYROID STIM HORMONE: CPT

## 2023-11-03 PROCEDURE — 82306 VITAMIN D 25 HYDROXY: CPT

## 2023-11-03 PROCEDURE — 82607 VITAMIN B-12: CPT

## 2023-11-03 PROCEDURE — 83036 HEMOGLOBIN GLYCOSYLATED A1C: CPT

## 2023-11-03 PROCEDURE — 80053 COMPREHEN METABOLIC PANEL: CPT

## 2023-11-03 PROCEDURE — 36415 COLL VENOUS BLD VENIPUNCTURE: CPT

## 2023-11-03 RX ORDER — ERGOCALCIFEROL 1.25 MG/1
50000 CAPSULE ORAL
Qty: 4 CAPSULE | Refills: 1 | Status: SHIPPED | OUTPATIENT
Start: 2023-11-03 | End: 2023-12-28 | Stop reason: SDUPTHER

## 2023-11-06 DIAGNOSIS — D48.5 NEOPLASM OF UNCERTAIN BEHAVIOR OF SKIN: Primary | ICD-10-CM

## 2023-11-06 NOTE — RESULT ENCOUNTER NOTE
[facility] Message was also left on patients voicemail if patient has further questions regarding pathology report. Pt can be scheduled for excision.

## 2023-11-29 ENCOUNTER — APPOINTMENT (OUTPATIENT)
Dept: PRIMARY CARE | Facility: CLINIC | Age: 58
End: 2023-11-29
Payer: COMMERCIAL

## 2023-12-11 ENCOUNTER — ANCILLARY PROCEDURE (OUTPATIENT)
Dept: RADIOLOGY | Facility: CLINIC | Age: 58
End: 2023-12-11
Payer: COMMERCIAL

## 2023-12-11 DIAGNOSIS — Z12.31 SCREENING MAMMOGRAM FOR BREAST CANCER: ICD-10-CM

## 2023-12-11 DIAGNOSIS — Z78.0 POSTMENOPAUSE: ICD-10-CM

## 2023-12-11 PROCEDURE — 77063 BREAST TOMOSYNTHESIS BI: CPT | Performed by: RADIOLOGY

## 2023-12-11 PROCEDURE — 77080 DXA BONE DENSITY AXIAL: CPT | Performed by: RADIOLOGY

## 2023-12-11 PROCEDURE — 77080 DXA BONE DENSITY AXIAL: CPT

## 2023-12-11 PROCEDURE — 77063 BREAST TOMOSYNTHESIS BI: CPT

## 2023-12-11 PROCEDURE — 77067 SCR MAMMO BI INCL CAD: CPT | Performed by: RADIOLOGY

## 2023-12-12 PROBLEM — M85.89 OSTEOPENIA OF MULTIPLE SITES: Status: ACTIVE | Noted: 2023-12-12

## 2023-12-27 ENCOUNTER — LAB (OUTPATIENT)
Dept: LAB | Facility: LAB | Age: 58
End: 2023-12-27
Payer: COMMERCIAL

## 2023-12-27 DIAGNOSIS — E53.8 VITAMIN B12 DEFICIENCY: ICD-10-CM

## 2023-12-27 DIAGNOSIS — E55.9 VITAMIN D DEFICIENCY: ICD-10-CM

## 2023-12-27 PROCEDURE — 36415 COLL VENOUS BLD VENIPUNCTURE: CPT

## 2023-12-27 PROCEDURE — 82306 VITAMIN D 25 HYDROXY: CPT

## 2023-12-27 PROCEDURE — 82607 VITAMIN B-12: CPT

## 2023-12-28 DIAGNOSIS — E53.8 VITAMIN B12 DEFICIENCY: Primary | ICD-10-CM

## 2023-12-28 DIAGNOSIS — E55.9 VITAMIN D DEFICIENCY: ICD-10-CM

## 2023-12-28 LAB
25(OH)D3 SERPL-MCNC: 32 NG/ML (ref 30–100)
VIT B12 SERPL-MCNC: 362 PG/ML (ref 211–911)

## 2023-12-28 RX ORDER — ERGOCALCIFEROL 1.25 MG/1
50000 CAPSULE ORAL
Qty: 4 CAPSULE | Refills: 1 | Status: SHIPPED | OUTPATIENT
Start: 2023-12-28 | End: 2024-01-03 | Stop reason: SDUPTHER

## 2024-01-03 DIAGNOSIS — E55.9 VITAMIN D DEFICIENCY: ICD-10-CM

## 2024-01-03 DIAGNOSIS — E55.9 VITAMIN D DEFICIENCY: Primary | Chronic | ICD-10-CM

## 2024-01-03 RX ORDER — ERGOCALCIFEROL 1.25 MG/1
50000 CAPSULE ORAL
Qty: 4 CAPSULE | Refills: 2 | Status: SHIPPED | OUTPATIENT
Start: 2024-01-03 | End: 2024-03-27

## 2024-02-12 ENCOUNTER — OFFICE VISIT (OUTPATIENT)
Dept: PRIMARY CARE | Facility: CLINIC | Age: 59
End: 2024-02-12
Payer: COMMERCIAL

## 2024-02-12 VITALS
RESPIRATION RATE: 16 BRPM | WEIGHT: 176.2 LBS | DIASTOLIC BLOOD PRESSURE: 74 MMHG | HEART RATE: 57 BPM | TEMPERATURE: 98.1 F | SYSTOLIC BLOOD PRESSURE: 142 MMHG | BODY MASS INDEX: 27.6 KG/M2 | OXYGEN SATURATION: 98 %

## 2024-02-12 DIAGNOSIS — J45.20 MILD INTERMITTENT ASTHMA WITHOUT COMPLICATION (HHS-HCC): Chronic | ICD-10-CM

## 2024-02-12 DIAGNOSIS — I10 BENIGN ESSENTIAL HYPERTENSION: Chronic | ICD-10-CM

## 2024-02-12 DIAGNOSIS — L82.1 SEBORRHEIC KERATOSIS: ICD-10-CM

## 2024-02-12 DIAGNOSIS — L57.0 ACTINIC KERATOSIS: ICD-10-CM

## 2024-02-12 DIAGNOSIS — E55.9 VITAMIN D DEFICIENCY: Chronic | ICD-10-CM

## 2024-02-12 DIAGNOSIS — L91.8 SKIN TAGS, MULTIPLE ACQUIRED: Primary | ICD-10-CM

## 2024-02-12 DIAGNOSIS — E66.3 OVERWEIGHT WITH BODY MASS INDEX (BMI) OF 27 TO 27.9 IN ADULT: ICD-10-CM

## 2024-02-12 DIAGNOSIS — Z86.718 HISTORY OF DVT (DEEP VEIN THROMBOSIS): Chronic | ICD-10-CM

## 2024-02-12 DIAGNOSIS — Z79.899 MEDICAL MARIJUANA USE: Chronic | ICD-10-CM

## 2024-02-12 PROCEDURE — 3077F SYST BP >= 140 MM HG: CPT | Performed by: FAMILY MEDICINE

## 2024-02-12 PROCEDURE — 3008F BODY MASS INDEX DOCD: CPT | Performed by: FAMILY MEDICINE

## 2024-02-12 PROCEDURE — 3078F DIAST BP <80 MM HG: CPT | Performed by: FAMILY MEDICINE

## 2024-02-12 PROCEDURE — 99214 OFFICE O/P EST MOD 30 MIN: CPT | Performed by: FAMILY MEDICINE

## 2024-02-12 PROCEDURE — 1036F TOBACCO NON-USER: CPT | Performed by: FAMILY MEDICINE

## 2024-02-12 RX ORDER — BUDESONIDE AND FORMOTEROL FUMARATE DIHYDRATE 160; 4.5 UG/1; UG/1
2 AEROSOL RESPIRATORY (INHALATION) 2 TIMES DAILY
COMMUNITY
Start: 2023-11-24

## 2024-02-12 NOTE — PROGRESS NOTES
Subjective   Patient ID: Elda Anne is a 58 y.o. female who presents for magnesium levels, Hypertension, dexa scan, Skin Tag, and clotting .    HPI   HTN  Not currently taking medication  Home BP readings: 135/69, 144/74, 133/70, 141/76, 129/67  129/69  Exercise-elliptical few times a week, just started this  Increased weight, 3lbs since last visit  A1c 5.6 since November,normal TSH      Osteopenia  She is getting good calcium sources, however was low on vitamin D and currently taking her weekly RX  LMP 2017    Has appointment with Gyn for breast pain, normal mammogram.     Derm--note reviewed     Skin Tags (neck)  They get caught on clothing  The lesions are painful at times  The do not bleed    Aks  Located on abdomen    Saw Pulmonary Medicine-asthma ([facility])  CT scheduled     Due [facility] and [facility] (patient is already established)    Review of Systems  See HPI    Objective   /74 (BP Location: Right arm, Patient Position: Sitting, BP Cuff Size: Adult)   Pulse 57   Temp 36.7 °C (98.1 °F) (Temporal)   Resp 16   Wt 79.9 kg (176 lb 3.2 oz)   SpO2 98%   BMI 27.60 kg/m²     Physical Exam  Constitutional: Well developed, well nourished, alert and in no acute distress   Eyes: Normal external exam.   Cardiovascular: Regular rate and rhythm, normal S1 and S2, no murmurs, gallops, or rubs. Radial pulses normal. No peripheral edema.  Pulmonary: No respiratory distress, lungs clear to auscultation bilaterally. No wheezes, rhonchi, rales.  Skin: Warm, well perfused, normal skin turgor and color. +several Sks-non inflammed on back, numerous accessory skin tags located on bilateral neck.   Neurologic: Cranial nerves II-XII grossly intact.   Psychiatric: Mood calm and affect normal.    Assessment/Plan   Lab ordered    Keep appointment with derm    Schedule for cryotherapy for back Sks/Aks and skin tag removal (45 minutes)    We discussed rechecking vitamin D level in a few weeks,  continuing exercise and weights for osteoporosis prevention    Reviewed labs and DEXA with patient    Hypertension (High Blood Pressure)  -elevated in office but home Bps are majority below 140/90. We agreed to continue to closely monitor at this time and not start medication yet.   -follow a low sodium diet  -limit stress, alcohol, tobacco and caffeine as much as possible    It is important to control Blood Pressure for reducing risk of stroke, heart attack, kidney damage, and circulatory problems from clogged arteries.  Advised new blood pressure goals based on evidence from recent studies showed blood pressure should be less than 130/80.   Check your BP at least weekly and If your BP is above this goal on repeated measurements, please contact us so we can make treatment adjustments.  Diet recommendations - reduce sodium intake (less than 2,400 mg/day), follow DASH diet, increase exercise (150 minutes per week), lose weight (Goal BMI < 25).   Generally recommend follow up at least every 6 months.    If you are less than 60 years old, have diabetes mellitus, or chronic kidney disease, your goal blood pressure is < 140/90.  If you are older than 60 years old and do not have diabetes or kidney disease, your goal blood pressure is < 150/90.

## 2024-03-11 ENCOUNTER — PROCEDURE VISIT (OUTPATIENT)
Dept: PRIMARY CARE | Facility: CLINIC | Age: 59
End: 2024-03-11
Payer: COMMERCIAL

## 2024-03-11 VITALS
DIASTOLIC BLOOD PRESSURE: 82 MMHG | BODY MASS INDEX: 27.57 KG/M2 | SYSTOLIC BLOOD PRESSURE: 153 MMHG | OXYGEN SATURATION: 97 % | TEMPERATURE: 98 F | RESPIRATION RATE: 16 BRPM | WEIGHT: 176 LBS | HEART RATE: 52 BPM

## 2024-03-11 DIAGNOSIS — L57.0 ACTINIC KERATOSIS: ICD-10-CM

## 2024-03-11 DIAGNOSIS — L91.8 SKIN TAGS, MULTIPLE ACQUIRED: Primary | ICD-10-CM

## 2024-03-11 PROCEDURE — 17003 DESTRUCT PREMALG LES 2-14: CPT | Performed by: FAMILY MEDICINE

## 2024-03-11 PROCEDURE — 11200 RMVL SKIN TAGS UP TO&INC 15: CPT | Performed by: FAMILY MEDICINE

## 2024-03-11 PROCEDURE — 17000 DESTRUCT PREMALG LESION: CPT | Performed by: FAMILY MEDICINE

## 2024-03-11 NOTE — PROGRESS NOTES
Subjective   Patient ID: Elda Anne is a 58 y.o. female who presents for     HPI   Patient ID: Elda Anne is a 58 y.o. female.    Skin Tag Removal    Date/Time: 3/17/2024 10:44 PM    Performed by: Sole Womack DO  Authorized by: Sole Womack DO    Consent:     Consent obtained:  Written    Consent given by:  Patient    Risks, benefits, and alternatives were discussed: yes      Risks discussed:  Bleeding, infection, pain, poor cosmetic result and nerve damage    Alternatives discussed:  No treatment, observation and referral  Universal protocol:     Procedure explained and questions answered to patient or proxy's satisfaction: yes      Relevant documents present and verified: yes      Test results available: no      Imaging studies available: no      Required blood products, implants, devices, and special equipment available: no      Site/side marked: yes      Immediately prior to procedure, a time out was called: yes      Patient identity confirmed:  Verbally with patient and provided demographic data  Indications:     Indications:  Pain, catching on clothing/jewelry  Pre-procedure details:     Skin preparation:  Povidone-iodine    Preparation: Patient was prepped and draped in the usual sterile fashion    Sedation:     Sedation type:  None  Anesthesia:     Anesthesia method:  Topical application    Topical anesthesia: cold spray.  Procedure specific details:      4 skin tags located on neck removed via cold spray and medical scissors  Post-procedure details:     Procedure completion:  Tolerated well, no immediate complications  Destruction of lesion    Date/Time: 3/17/2024 10:46 PM    Performed by: Sole Womack DO  Authorized by: Sole Womack DO    Number of Lesions: 6  Lesion 1:     Body area: trunk    Trunk location: back    Initial size (mm): 2    Final defect size (mm): 2    Malignancy: benign lesion      Destruction method: cryotherapy    Lesion 2:     Body area: trunk    Trunk  location: back    Initial size (mm): 1    Final defect size (mm): 1    Malignancy: benign lesion      Destruction method: cryotherapy    Lesion 3:     Body area: trunk    Trunk location: back    Initial size (mm): 2.5    Final defect size (mm): 2.5    Malignancy: benign lesion      Destruction method: cryotherapy    Lesion 4:     Body area: trunk    Trunk location: back    Initial size (mm): 4    Final defect size (mm): 4    Malignancy: benign lesion      Destruction method: cryotherapy    Lesion 5:     Body area: upper extremity    Upper extremity location: L lower arm    Inital size (mm): 2    Final defect size (mm): 2    Malignancy: benign lesion      Destruction method: cryotherapy    Lesion 6:     Body area: upper extremity    Upper extremity location: L upper arm    Initial size (mm): 2.5    Final defect size (mm): 2.5    Malignancy: benign lesion      Destruction method: cryotherapy    Destruction of lesion    Date/Time: 3/17/2024 10:50 PM    Performed by: Sole Womack DO  Authorized by: Sole Womack DO    Number of Lesions: 3  Lesion 1:     Body area: head/neck    Head/neck location: neck    Initial size (mm): 1    Final defect size (mm): 1    Malignancy: benign lesion      Destruction method: cryotherapy    Lesion 2:     Body area: head/neck    Head/neck location: neck    Initial size (mm): 1    Final defect size (mm): 1    Malignancy: benign lesion      Destruction method: cryotherapy    Lesion 3:     Body area: head/neck    Head/neck location: neck    Initial size (mm): 0.5    Final defect size (mm): 0.5    Malignancy: benign lesion      Destruction method: cryotherapy        Review of Systems  See HPI    Objective   /82 (BP Location: Right arm, Patient Position: Sitting, BP Cuff Size: Adult)   Pulse 52   Temp 36.7 °C (98 °F) (Temporal)   Resp 16   Wt 79.8 kg (176 lb)   SpO2 97%   BMI 27.57 kg/m²     Physical Exam  Constitutional: Well developed, well nourished, alert and in no  acute distress   Eyes: Normal external exam.   Pulmonary: No respiratory distress  Skin: Warm, well perfused, normal skin turgor and color.     1 AK L mid  back- cryo  1 L lower AK- cryo  2 skin tags mid back - cryo   1 AK L lower arm- cryo   1 AK L upper arm cryo   4 skin tags R neck removal   2 skin tag R neck -cryo  1 skin tag L neck - cryo    Neurologic: Cranial nerves II-XII grossly intact.   Psychiatric: Mood calm and affect normal.    Assessment/Plan   Please keep the surgical sites clean    We discussed that you may experience pain, swelling, redness and even blisters in the areas that endured cryotherapy.     Please contact us with any concerns for infection: redness, pain, fever, swelling, purulent drainage

## 2024-03-16 ENCOUNTER — OFFICE VISIT (OUTPATIENT)
Dept: URGENT CARE | Facility: CLINIC | Age: 59
End: 2024-03-16
Payer: COMMERCIAL

## 2024-03-16 VITALS
TEMPERATURE: 99.1 F | DIASTOLIC BLOOD PRESSURE: 72 MMHG | RESPIRATION RATE: 16 BRPM | OXYGEN SATURATION: 97 % | HEART RATE: 60 BPM | SYSTOLIC BLOOD PRESSURE: 146 MMHG

## 2024-03-16 DIAGNOSIS — J01.90 ACUTE SINUSITIS, RECURRENCE NOT SPECIFIED, UNSPECIFIED LOCATION: Primary | ICD-10-CM

## 2024-03-16 DIAGNOSIS — H10.31 ACUTE CONJUNCTIVITIS OF RIGHT EYE, UNSPECIFIED ACUTE CONJUNCTIVITIS TYPE: ICD-10-CM

## 2024-03-16 PROCEDURE — 3008F BODY MASS INDEX DOCD: CPT | Performed by: PHYSICIAN ASSISTANT

## 2024-03-16 PROCEDURE — 99203 OFFICE O/P NEW LOW 30 MIN: CPT | Performed by: PHYSICIAN ASSISTANT

## 2024-03-16 PROCEDURE — 1036F TOBACCO NON-USER: CPT | Performed by: PHYSICIAN ASSISTANT

## 2024-03-16 RX ORDER — OFLOXACIN 3 MG/ML
1 SOLUTION/ DROPS OPHTHALMIC 4 TIMES DAILY
Qty: 5 ML | Refills: 0 | Status: SHIPPED | OUTPATIENT
Start: 2024-03-16 | End: 2024-03-21

## 2024-03-16 RX ORDER — AMOXICILLIN 500 MG/1
500 CAPSULE ORAL 3 TIMES DAILY
Qty: 30 CAPSULE | Refills: 0 | Status: SHIPPED | OUTPATIENT
Start: 2024-03-16 | End: 2024-03-26

## 2024-03-16 ASSESSMENT — ENCOUNTER SYMPTOMS
EYE REDNESS: 1
EYE ITCHING: 1
PHOTOPHOBIA: 0
EYE DISCHARGE: 1
SINUS PRESSURE: 1
EYE PAIN: 0
SORE THROAT: 1

## 2024-03-16 ASSESSMENT — PAIN SCALES - GENERAL: PAINLEVEL: 2

## 2024-03-16 NOTE — PROGRESS NOTES
Subjective   Patient ID: Elda Anne is a 58 y.o. female.    Patient is a 58-year-old female who complains of congestion, sinus pressure, ear fullness and sore throat that she has been experiencing for 1 week.  Patient also states that when she awoke this morning she noted intense redness and irritation to her right eye in addition to significant matting and discharge.  Patient does not wear contact lenses and wears prescription eyeglasses and states that her vision is intact and unchanged.  Patient reports that her left eye is asymptomatic.  Patient states that her grandson was recently treated for pinkeye and just completed his antibiotic with good resolution.  Patient herself denies injury or foreign body to her right eye and states that she has no history of conjunctivitis.  Patient denies fever, chills or myalgia.      Eye Problem  Associated symptoms: discharge, itching and redness    Associated symptoms: no photophobia    The following portions of the chart were reviewed this encounter and updated as appropriate:       Review of Systems   HENT:  Positive for congestion, ear pain, sinus pressure and sore throat.    Eyes:  Positive for discharge, redness and itching. Negative for photophobia, pain and visual disturbance.   All other systems reviewed and are negative.  Objective   Physical Exam  Vitals and nursing note reviewed.   Constitutional:       Appearance: Normal appearance. She is normal weight.   HENT:      Head: Normocephalic and atraumatic.      Right Ear: Tympanic membrane, ear canal and external ear normal.      Left Ear: Tympanic membrane, ear canal and external ear normal.      Nose: Nose normal. No congestion or rhinorrhea.      Mouth/Throat:      Mouth: Mucous membranes are moist.      Pharynx: Oropharynx is clear. No oropharyngeal exudate or posterior oropharyngeal erythema.   Eyes:      General:         Right eye: No discharge.         Left eye: No discharge.      Extraocular Movements:  Extraocular movements intact.      Pupils: Pupils are equal, round, and reactive to light.      Comments: Intense erythema and injection is noted to the right conjunctiva.  No matting or discharge is noted to the right eye.  Right superior and inferior eyelids are clear without erythema or edema.  Right periorbital skin is clear without erythema or edema.  Left conjunctiva, eyelids and periorbital skin is unremarkable.  Pupils are equal, round and reactive to light and accommodation and the patient demonstrates full extraocular range of motion bilaterally.   Cardiovascular:      Rate and Rhythm: Normal rate and regular rhythm.      Pulses: Normal pulses.      Heart sounds: Normal heart sounds.   Pulmonary:      Effort: Pulmonary effort is normal. No respiratory distress.      Breath sounds: Normal breath sounds. No stridor. No wheezing, rhonchi or rales.   Musculoskeletal:      Cervical back: Normal range of motion and neck supple.   Skin:     General: Skin is warm and dry.      Capillary Refill: Capillary refill takes less than 2 seconds.   Neurological:      General: No focal deficit present.      Mental Status: She is alert and oriented to person, place, and time.   Psychiatric:         Mood and Affect: Mood normal.         Behavior: Behavior normal.         Thought Content: Thought content normal.         Judgment: Judgment normal.     Assessment/Plan   Physical exam findings as noted above.  Patient was provided with prescriptions for amoxicillin 500 mg and ofloxacin 0.3% ophthalmic solution.  Instructions for application of the ophthalmic solution were discussed and the patient verbalizes excellent understanding of same.    CLINICAL IMPRESSION:  Acute Sinusitis;  Acute Conjunctivitis    Diagnoses and all orders for this visit:  Acute sinusitis, recurrence not specified, unspecified location  -     amoxicillin (Amoxil) 500 mg capsule; Take 1 capsule (500 mg) by mouth 3 times a day for 10 days.  Acute  conjunctivitis of right eye, unspecified acute conjunctivitis type  -     ofloxacin (Ocuflox) 0.3 % ophthalmic solution; Administer 1 drop into the right eye 4 times a day for 5 days.

## 2024-04-04 ENCOUNTER — PROCEDURE VISIT (OUTPATIENT)
Dept: DERMATOLOGY | Facility: CLINIC | Age: 59
End: 2024-04-04
Payer: COMMERCIAL

## 2024-04-04 DIAGNOSIS — C44.529 SQUAMOUS CELL CARCINOMA OF BACK: ICD-10-CM

## 2024-04-04 PROCEDURE — 88305 TISSUE EXAM BY PATHOLOGIST: CPT | Performed by: DERMATOLOGY

## 2024-04-04 PROCEDURE — 11602 EXC TR-EXT MAL+MARG 1.1-2 CM: CPT | Performed by: STUDENT IN AN ORGANIZED HEALTH CARE EDUCATION/TRAINING PROGRAM

## 2024-04-04 PROCEDURE — 12032 INTMD RPR S/A/T/EXT 2.6-7.5: CPT | Performed by: STUDENT IN AN ORGANIZED HEALTH CARE EDUCATION/TRAINING PROGRAM

## 2024-04-04 NOTE — PROGRESS NOTES
Excision Operative Note    Date of Surgery:  4/4/2024  Surgeon:  Filemon Riddle MD  Office Location: [address] [address]  Dept: [phone]  Dept Fax: [phone]  Referring Provider: Angie Sanchez MD  [address] [place] [address]    Adiel Anne is a 58 y.o. 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     According to the patient, the lesion has been present for approximately unknown at the time of diagnosis.  The lesion is not causing symptoms.  The lesion has not been treated previously.    The patient does not have a pacemaker / defibrillator.  The patient does not have a heart valve / joint replacement.    The patient is not on blood thinners.   The patient does not have a history of hepatitis B or C.  The patient does not have a history of HIV.  The patient does not have a history of immunosuppression (e.g. organ transplantation, malignancy, medications)    Is it okay to leave a phone message with results? Yes  Who else may we leave results with: (name, relationship): Joshua Anne (spouse)     The following portions of the chart were reviewed this encounter and updated as appropriate:       Assessment/Plan   Pre-procedure:   Obtained informed consent: written from patient  The surgical site was identified and confirmed with the patient.     Intra-operative:   Audible time out called at : 3:12 PM 04/04/24  by: TONYA DOWNING RN   Verified patient name, birthdate, site, specimen bottle label & requisition.    The planned procedure(s) was again reviewed with the patient. The risks of bleeding, infection, nerve damage and scarring were reviewed. The patient identity, surgical site, and planned procedure(s) were verified.     Biopsy Accession Number: [id number]  Squamous cell carcinoma of back  Left Upper Back    Skin excision    Lesion length (cm):   0.8  Lesion width (cm):  0.6  Margin per side (cm):  0.4  Total excision diameter (cm):  1.6  Informed consent: discussed and consent obtained    Timeout: patient name, date of birth, surgical site, and procedure verified    Procedure prep:  Patient prepped in sterile fashion  Anesthesia: the lesion was anesthetized in a standard fashion    Anesthetic:  1% lidocaine w/ epinephrine 1-100,000 local infiltration  Instrument used: #15 blade    Hemostasis achieved with: electrodesiccation    Outcome: patient tolerated procedure well with no complications    Post-procedure details: sterile dressing applied and wound care instructions given    Dressing type: pressure dressing    Additional details:  The nature of the diagnosis was explained. The lesion is a skin cancer.  It is locally aggressive but has a very low to non-existent risk of spreading.  The condition is associated with sun exposure.  Warning signs of non-melanoma skin cancer discussed. Patient was instructed to perform monthly self skin examination.  We recommended that the patient have regular full skin exams given an increased risk of subsequent skin cancers. The patient was instructed to use sun protective behaviors including use of broad spectrum sunscreens and sun protective clothing to reduce risk of skin cancers.  Excision was discussed with the patient. The risks, benefits and potential adverse effects were reviewed. Discussion included but was not limited to the cure rate, relative cost, wound care requirements, activity restrictions, likely scar outcome and time to heal were reviewed. It was explained that the scar would be longer than the original lesion.  The patient elected to proceed with exicision today.    Skin repair  Complexity:  Intermediate  Final length (cm):  3  Informed consent: discussed and consent obtained    Timeout: patient name, date of birth, surgical site, and procedure verified    Procedure prep:  Patient prepped in sterile  fashion  Anesthesia: the lesion was anesthetized in a standard fashion    Anesthetic:  1% lidocaine w/ epinephrine 1-100,000 local infiltration  Reason for type of repair: reduce tension to allow closure    Undermining: edges undermined    Subcutaneous layers (deep stitches):   Suture size:  3-0  Suture type: Prolene (polypropylene)    Stitches:  Buried vertical mattress  Fine/surface layer approximation (top stitches):   Suture size:  5-0  Suture type: fast-absorbing plain gut    Stitches: simple running    Hemostasis achieved with: electrodesiccation  Outcome: patient tolerated procedure well with no complications    Post-procedure details: sterile dressing applied and wound care instructions given    Dressing type: pressure dressing      Specimen 1 - Dermatopathology- DERM LAB  Differential Diagnosis: SCC vs cyst   Check Margins Yes/No?:  yes  Comments:  4mm margins, indication stitch at superior tip   Dermpath Lab: Routine Histopathology (formalin-fixed tissue)      Intermediate Linear Repair:  Given the location and size of the defect, it was determined that an intermediate layered linear closure was required to restore normal anatomy and function. The repair is an intermediate closure as two layers of sutures were required. The defect was undermined extensively at the level of the subcutaneous plane. Standing cutaneous cones were removed using Burow's triangles. The wound edges were brought into close approximation with buried vertical mattress sutures. The remainder of the wound was then closed with epidermal top sutures.    The final repair measured 3.0 cm    Wound care was discussed, and the patient was given written post-operative wound care instructions.      The patient will follow up with Filemon Riddle MD as needed for any post operative problems or concerns, and will follow up with their primary dermatologist as scheduled.

## 2024-04-09 ENCOUNTER — TELEPHONE (OUTPATIENT)
Dept: DERMATOLOGY | Facility: CLINIC | Age: 59
End: 2024-04-09
Payer: COMMERCIAL

## 2024-04-09 LAB
LABORATORY COMMENT REPORT: NORMAL
PATH REPORT.FINAL DX SPEC: NORMAL
PATH REPORT.GROSS SPEC: NORMAL
PATH REPORT.MICROSCOPIC SPEC OTHER STN: NORMAL
PATH REPORT.RELEVANT HX SPEC: NORMAL
PATH REPORT.TOTAL CANCER: NORMAL

## 2024-04-09 NOTE — TELEPHONE ENCOUNTER
Called patient to relay the pathology results from her procedure done on 4/4/24, and after relaying results, the patient explained her concern of the appearance of her surgical site and how it is healing. She explained the site has reddened in the past couple of days, but denies any pain or tenderness of the site, drainage, or warmth to the touch. Patient also denies any shortness of breath or fever. I had the patient send a photo of the site for Dr. Riddle to review, and Dr. Riddle said the site looks good and it is normal to have some redness as the site is healing. I called the patient back and let her know that Dr. Riddle said the site looks good, and to continue bandaging with Vaseline until the sutures completely dissolve. Patient voiced her understanding and was advised to call back if the site becomes painful, swollen, has any drainage, or if she develops a fever or becomes short of breath. Patient had no other questions or concerns but was advised to call if any arise.

## 2024-05-13 ENCOUNTER — PATIENT OUTREACH (OUTPATIENT)
Dept: CARE COORDINATION | Facility: CLINIC | Age: 59
End: 2024-05-13
Payer: COMMERCIAL

## 2024-05-13 RX ORDER — OXYCODONE AND ACETAMINOPHEN 5; 325 MG/1; MG/1
1 TABLET ORAL EVERY 6 HOURS PRN
COMMUNITY

## 2024-05-13 NOTE — PROGRESS NOTES
Discharge Facility:[facility]  Discharge Diagnosis:  Admission Date:5/11/2024  Discharge Date: 5/12/2024    PCP Appointment Date:Declined  Specialist Appointment Date: 5/21/2024 Surgeon/Peabody   Hospital Encounter and Summary: Linked   See discharge assessment below for further details  Engagement  Call Start Time: 1535 (5/13/2024  3:53 PM)    Medications  Medications reviewed with patient/caregiver?: Yes (5/13/2024  3:53 PM)  Is the patient having any side effects they believe may be caused by any medication additions or changes?: No (5/13/2024  3:53 PM)  Does the patient have all medications ordered at discharge?: Yes (Her  is picking up today, she has been able to manage with tylenol so far. We discussed may need to use stool softener.) (5/13/2024  3:53 PM)  Care Management Interventions: No intervention needed (5/13/2024  3:53 PM)  Prescription Comments: -- (Percocet 5/325 mg one q 6 hours prn) (5/13/2024  3:53 PM)  Is the patient taking all medications as directed (includes completed medication regime)?: Yes (5/13/2024  3:53 PM)  Care Management Interventions: Provided patient education (as above discussed stool softener) (5/13/2024  3:53 PM)    Appointments  Does the patient have a primary care provider?: Yes (5/13/2024  3:53 PM)  Care Management Interventions: -- (Declined PCP follow up, will see Surgeon) (5/13/2024  3:53 PM)  Has the patient kept scheduled appointments due by today?: Yes (5/13/2024  3:53 PM)    Self Management  What is the home health agency?: -- (N/A) (5/13/2024  3:53 PM)  What Durable Medical Equipment (DME) was ordered?: -- (N/A) (5/13/2024  3:53 PM)    Patient Teaching  Does the patient have access to their discharge instructions?: Yes (5/13/2024  3:53 PM)  What is the patient's perception of their health status since discharge?: Improving (5/13/2024  3:53 PM)  Is the patient/caregiver able to teach back the hierarchy of who to call/visit for symptoms/problems? PCP, Specialist, Home  Health nurse, Urgent Care, ED, 911: Yes (5/13/2024  3:53 PM)    Wrap Up  Wrap Up Additional Comments: -- (Elda is doing well.Was not able to get pain meds d/t pharm was closed, her  getting today. She has not had bm yet, we discussed should use stool softener. She will fu with surgeon. She will contact provider if any concerns.) (5/13/2024  3:53 PM)

## 2024-06-03 ENCOUNTER — LAB REQUISITION (OUTPATIENT)
Dept: LAB | Facility: HOSPITAL | Age: 59
End: 2024-06-03
Payer: COMMERCIAL

## 2024-06-03 DIAGNOSIS — R35.0 FREQUENCY OF MICTURITION: ICD-10-CM

## 2024-06-03 PROCEDURE — 87086 URINE CULTURE/COLONY COUNT: CPT

## 2024-06-04 LAB — BACTERIA UR CULT: NORMAL

## 2024-07-03 ENCOUNTER — APPOINTMENT (OUTPATIENT)
Dept: PRIMARY CARE | Facility: CLINIC | Age: 59
End: 2024-07-03
Payer: COMMERCIAL

## 2024-07-05 ENCOUNTER — APPOINTMENT (OUTPATIENT)
Dept: PRIMARY CARE | Facility: CLINIC | Age: 59
End: 2024-07-05
Payer: COMMERCIAL

## 2024-09-11 ENCOUNTER — APPOINTMENT (OUTPATIENT)
Dept: PRIMARY CARE | Facility: CLINIC | Age: 59
End: 2024-09-11
Payer: COMMERCIAL

## 2024-09-17 ENCOUNTER — APPOINTMENT (OUTPATIENT)
Dept: PRIMARY CARE | Facility: CLINIC | Age: 59
End: 2024-09-17
Payer: COMMERCIAL

## 2024-09-17 ENCOUNTER — LAB (OUTPATIENT)
Dept: LAB | Facility: LAB | Age: 59
End: 2024-09-17
Payer: COMMERCIAL

## 2024-09-17 VITALS
WEIGHT: 161 LBS | RESPIRATION RATE: 12 BRPM | DIASTOLIC BLOOD PRESSURE: 68 MMHG | BODY MASS INDEX: 25.22 KG/M2 | SYSTOLIC BLOOD PRESSURE: 146 MMHG | TEMPERATURE: 98 F | OXYGEN SATURATION: 98 % | HEART RATE: 49 BPM

## 2024-09-17 DIAGNOSIS — R10.13 EPIGASTRIC ABDOMINAL PAIN: ICD-10-CM

## 2024-09-17 DIAGNOSIS — F40.243 ANXIETY WITH FLYING: ICD-10-CM

## 2024-09-17 DIAGNOSIS — R91.1 PULMONARY NODULE: ICD-10-CM

## 2024-09-17 DIAGNOSIS — K52.9 ENTERITIS: ICD-10-CM

## 2024-09-17 DIAGNOSIS — M25.50 ARTHRALGIA, UNSPECIFIED JOINT: ICD-10-CM

## 2024-09-17 DIAGNOSIS — Z11.3 SCREEN FOR STD (SEXUALLY TRANSMITTED DISEASE): ICD-10-CM

## 2024-09-17 DIAGNOSIS — E55.9 VITAMIN D DEFICIENCY: ICD-10-CM

## 2024-09-17 DIAGNOSIS — Z86.718 HISTORY OF DVT (DEEP VEIN THROMBOSIS): ICD-10-CM

## 2024-09-17 DIAGNOSIS — E83.41 HIGH MAGNESIUM LEVELS: ICD-10-CM

## 2024-09-17 DIAGNOSIS — Z09 HOSPITAL DISCHARGE FOLLOW-UP: Primary | ICD-10-CM

## 2024-09-17 DIAGNOSIS — R73.01 ELEVATED FASTING BLOOD SUGAR: ICD-10-CM

## 2024-09-17 DIAGNOSIS — R79.89 ELEVATED BETA-2 MICROGLOBULIN: Chronic | ICD-10-CM

## 2024-09-17 DIAGNOSIS — E53.8 VITAMIN B12 DEFICIENCY: ICD-10-CM

## 2024-09-17 DIAGNOSIS — Z86.718 HISTORY OF DVT (DEEP VEIN THROMBOSIS): Chronic | ICD-10-CM

## 2024-09-17 DIAGNOSIS — K20.0 EOSINOPHILIC ESOPHAGITIS: ICD-10-CM

## 2024-09-17 DIAGNOSIS — R00.2 HEART PALPITATIONS: ICD-10-CM

## 2024-09-17 DIAGNOSIS — R03.0 ELEVATED BLOOD PRESSURE READING: ICD-10-CM

## 2024-09-17 PROBLEM — H10.31 ACUTE CONJUNCTIVITIS OF RIGHT EYE: Status: RESOLVED | Noted: 2024-03-16 | Resolved: 2024-09-17

## 2024-09-17 PROBLEM — J01.90 ACUTE SINUSITIS: Status: RESOLVED | Noted: 2024-03-16 | Resolved: 2024-09-17

## 2024-09-17 PROBLEM — H54.61 VISION LOSS OF RIGHT EYE: Status: RESOLVED | Noted: 2023-03-16 | Resolved: 2024-09-17

## 2024-09-17 PROCEDURE — 85652 RBC SED RATE AUTOMATED: CPT

## 2024-09-17 PROCEDURE — 99215 OFFICE O/P EST HI 40 MIN: CPT | Performed by: FAMILY MEDICINE

## 2024-09-17 PROCEDURE — 36415 COLL VENOUS BLD VENIPUNCTURE: CPT

## 2024-09-17 PROCEDURE — 83090 ASSAY OF HOMOCYSTEINE: CPT

## 2024-09-17 PROCEDURE — 83036 HEMOGLOBIN GLYCOSYLATED A1C: CPT

## 2024-09-17 PROCEDURE — 83970 ASSAY OF PARATHORMONE: CPT

## 2024-09-17 PROCEDURE — 86140 C-REACTIVE PROTEIN: CPT

## 2024-09-17 PROCEDURE — 87389 HIV-1 AG W/HIV-1&-2 AB AG IA: CPT

## 2024-09-17 PROCEDURE — 86803 HEPATITIS C AB TEST: CPT

## 2024-09-17 PROCEDURE — 82607 VITAMIN B-12: CPT

## 2024-09-17 PROCEDURE — 82306 VITAMIN D 25 HYDROXY: CPT

## 2024-09-17 PROCEDURE — 83735 ASSAY OF MAGNESIUM: CPT

## 2024-09-17 RX ORDER — HYDROXYZINE HYDROCHLORIDE 10 MG/1
10 TABLET, FILM COATED ORAL ONCE AS NEEDED
Qty: 30 TABLET | Refills: 0 | Status: SHIPPED | OUTPATIENT
Start: 2024-09-17 | End: 2024-10-17

## 2024-09-17 ASSESSMENT — ENCOUNTER SYMPTOMS
EYE REDNESS: 1
EYE PAIN: 1
JOINT SWELLING: 0

## 2024-09-17 NOTE — PROGRESS NOTES
"Subjective   Patient ID: Elda Anne is a 59 y.o. female who presents for follow up on multiple ED visits.    HPI   Patient went to the [facility] ED in May (5/11) and was admitted for cholecystectomy. She is following with Dr.Peabody for this (general surgeon).  She was seen in the [facility] ED on 7/9/2024 for upper abdominal pain and fevers. \"CT of the abdomen pelvis was done which showed findings of enteritis no fluid collection or bowel obstruction. Spoke with Dr. Peabody who is aware the patient's condition and agreed that she can follow-up with her PCP. \" Dx with enteritis and treated with supportive care.  Went to the [facility] ED for chest pain on 9/10/24. She was told to follow up with pulmonologist for pulmonary nodule and cardiologist (Renate Márquez, TANESHA) for palpitations. Dx with bronchitis, sent home with Rxs for prednisone and doxycycline. Patient had been sick with URI a week prior, negative home covid test. Mucinex caused her to feel lightheaded so stopped it.   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  Skips lunch. She feels since her surgery, she has had full body cramping. Would like stools studies for parasites, etc.   Has not had a HIDA scan yet per surgeon due to fever and concerns for leaking post surgery.  10 years ago she had a parasite in her intestine, would like testing for this. Denies vomiting.      H/o DVT (age 38)-unknown etiology   Dad passed from a PE (lung ca)  Brother had blood clotting issues, unknown  Never evaluated by hematology  Patient is concerned about flying to [place] and if she needs to prophylactically take aspirin, not currently on blood thinners  Would like a refill on her hydroxyzine or her anxiety for her flight and anxiety/stress.     Elevated magnesium level on last bw at ED- not taking OTC magnesium supplements.    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    Would like to talk about menopause and symptoms    Review of Systems   Eyes:  Positive for pain, redness and visual disturbance.   Musculoskeletal:  Negative for joint swelling.   All other systems reviewed and are negative.    Objective   /68 (BP Location: Right arm, Patient Position: Sitting, BP Cuff Size: Adult)   Pulse (!) 49   Temp 36.7 °C (98 °F) (Temporal)   Resp 12   Wt 73 kg (161 lb)   SpO2 98%   BMI 25.22 kg/m²     Physical Exam  Constitutional: Well developed, well nourished, alert and in no acute distress   Eyes: Normal external exam.   Neck: No thyromegaly or lymphadenopathy   Cardiovascular: Regular rate and rhythm, normal S1 and S2, no murmurs, gallops, or rubs. Radial pulses normal. No peripheral edema.  Pulmonary: No respiratory distress, lungs clear to auscultation bilaterally. No wheezes, rhonchi, rales.  Skin: Warm, well perfused, normal skin turgor and color.   Neurologic: Cranial nerves II-XII grossly intact.   Psychiatric: Mood calm and affect normal.    Assessment/Plan   Referral to Hematologist     Elevated magnesium-recheck level    Stool studies, inflammatory markers ordered per patient request     Follow up with Pulmonologist (4mm WOODY nodule of lung, typically these are benign and we do not follow them if you are low risk and less than 6mm in size)    Hypertension (High Blood Pressure)  -stable  -continue current medications  -follow a low sodium diet  -limit stress, alcohol, tobacco and caffeine as much as possible    It is important to control Blood Pressure for reducing risk of stroke, heart attack, kidney damage, and circulatory problems from clogged arteries.  Advised new blood pressure goals based on evidence from recent studies showed blood pressure should be less than 130/80.   Check your BP at least weekly and If your BP is above this goal on repeated measurements, please contact us so we can make treatment  adjustments.  Diet recommendations - reduce sodium intake (less than 2,400 mg/day), follow DASH diet, increase exercise (150 minutes per week), lose weight (Goal BMI < 25).   Generally recommend follow up at least every 6 months.    If you are less than 60 years old, have diabetes mellitus, or chronic kidney disease, your goal blood pressure is < 140/90.  If you are older than 60 years old and do not have diabetes or kidney disease, your goal blood pressure is < 150/90.    Hydroxyzine refilled for anxiety, this may cause drowsiness.    Patient asks about postmenopause, however, we discussed we will need to reschedule to talk about this.     Schedule back with cardiology     Referral to GI (Skylar Kilgore in [facility], )

## 2024-09-18 ENCOUNTER — LAB (OUTPATIENT)
Dept: LAB | Facility: LAB | Age: 59
End: 2024-09-18
Payer: COMMERCIAL

## 2024-09-18 DIAGNOSIS — K52.9 ENTERITIS: ICD-10-CM

## 2024-09-18 DIAGNOSIS — E55.9 VITAMIN D DEFICIENCY: Primary | ICD-10-CM

## 2024-09-18 DIAGNOSIS — E83.41 HIGH MAGNESIUM LEVELS: Primary | ICD-10-CM

## 2024-09-18 DIAGNOSIS — R10.13 EPIGASTRIC ABDOMINAL PAIN: ICD-10-CM

## 2024-09-18 LAB
25(OH)D3 SERPL-MCNC: 43 NG/ML (ref 30–100)
CRP SERPL-MCNC: 0.27 MG/DL
ERYTHROCYTE [SEDIMENTATION RATE] IN BLOOD BY WESTERGREN METHOD: 22 MM/H (ref 0–30)
EST. AVERAGE GLUCOSE BLD GHB EST-MCNC: 105 MG/DL
HBA1C MFR BLD: 5.3 %
HCV AB SER QL: NONREACTIVE
HCYS SERPL-SCNC: 22.29 UMOL/L (ref 5–13.9)
HIV 1+2 AB+HIV1 P24 AG SERPL QL IA: NONREACTIVE
MAGNESIUM SERPL-MCNC: 2.48 MG/DL (ref 1.6–2.4)
PTH-INTACT SERPL-MCNC: 70.5 PG/ML (ref 18.5–88)
VIT B12 SERPL-MCNC: 374 PG/ML (ref 211–911)

## 2024-09-18 PROCEDURE — 87328 CRYPTOSPORIDIUM AG IA: CPT

## 2024-09-18 PROCEDURE — 87449 NOS EACH ORGANISM AG IA: CPT

## 2024-09-18 PROCEDURE — 83993 ASSAY FOR CALPROTECTIN FECAL: CPT

## 2024-09-18 PROCEDURE — 87329 GIARDIA AG IA: CPT

## 2024-09-18 RX ORDER — ERGOCALCIFEROL 1.25 MG/1
50000 CAPSULE ORAL WEEKLY
Qty: 4 CAPSULE | Refills: 1 | Status: SHIPPED | OUTPATIENT
Start: 2024-09-18 | End: 2024-11-13

## 2024-09-18 NOTE — RESULT ENCOUNTER NOTE
Homocysteine level is elevated, recommend taking OTC methylfolate 1 mg daily (defect caused by potential MTHFR mutation)    Magnesium is minimally elevated and improving, adding on a few labs to your recent blood draw to r/o parathyroid issues. Please stop all stool softeners if you are taking one.     Negative for hepatitis C    Normal B12 level    Normal acute inflammatory marker    Normal vitamin D level    Normal chronic inflammatory marker

## 2024-09-19 DIAGNOSIS — E83.41 HIGH MAGNESIUM LEVELS: Primary | ICD-10-CM

## 2024-09-19 NOTE — RESULT ENCOUNTER NOTE
Normal parathyroid level. Suggest rechecking magnesium level in 4 weeks, if still elevated may consider an ultrasound of kidneys and urine testing

## 2024-09-21 LAB
CALPROTECTIN STL-MCNT: 7 UG/G
CRYPTOSP AG STL QL IA: NEGATIVE
G LAMBLIA AG STL QL IA: NEGATIVE
H PYLORI AG STL QL IA: NEGATIVE

## 2024-09-25 LAB — O+P STL MICRO: NEGATIVE

## 2024-10-01 DIAGNOSIS — R79.89 ELEVATED D-DIMER: Primary | ICD-10-CM

## 2024-10-07 ENCOUNTER — LAB (OUTPATIENT)
Dept: LAB | Facility: LAB | Age: 59
End: 2024-10-07
Payer: COMMERCIAL

## 2024-10-07 DIAGNOSIS — R79.89 ELEVATED D-DIMER: ICD-10-CM

## 2024-10-07 LAB — D DIMER PPP FEU-MCNC: 700 NG/ML FEU

## 2024-10-07 PROCEDURE — 85379 FIBRIN DEGRADATION QUANT: CPT

## 2024-10-07 PROCEDURE — 36415 COLL VENOUS BLD VENIPUNCTURE: CPT

## 2024-10-08 NOTE — RESULT ENCOUNTER NOTE
D-dimer is still elevated but improved from when checked in the ED and CTA was negative for clot. As we discussed, there are several etiologies that can falsely elevated this number. If you are having leg pain/swelling or chest pain I do suggest going back to the ED immediately for imaging.

## 2024-10-24 ENCOUNTER — OFFICE VISIT (OUTPATIENT)
Dept: HEMATOLOGY/ONCOLOGY | Facility: CLINIC | Age: 59
End: 2024-10-24
Payer: COMMERCIAL

## 2024-10-24 ENCOUNTER — LAB (OUTPATIENT)
Dept: LAB | Facility: CLINIC | Age: 59
End: 2024-10-24
Payer: COMMERCIAL

## 2024-10-24 ENCOUNTER — PATIENT OUTREACH (OUTPATIENT)
Dept: HEMATOLOGY/ONCOLOGY | Facility: CLINIC | Age: 59
End: 2024-10-24

## 2024-10-24 VITALS
RESPIRATION RATE: 16 BRPM | HEIGHT: 66 IN | HEART RATE: 58 BPM | BODY MASS INDEX: 25.58 KG/M2 | WEIGHT: 159.17 LBS | DIASTOLIC BLOOD PRESSURE: 80 MMHG | SYSTOLIC BLOOD PRESSURE: 158 MMHG | TEMPERATURE: 98.8 F | OXYGEN SATURATION: 98 %

## 2024-10-24 DIAGNOSIS — E83.41 HIGH MAGNESIUM LEVELS: ICD-10-CM

## 2024-10-24 DIAGNOSIS — Z86.718 HISTORY OF DVT (DEEP VEIN THROMBOSIS): ICD-10-CM

## 2024-10-24 LAB
ALBUMIN SERPL BCP-MCNC: 4.6 G/DL (ref 3.4–5)
ALP SERPL-CCNC: 96 U/L (ref 33–110)
ALT SERPL W P-5'-P-CCNC: 22 U/L (ref 7–45)
ANION GAP SERPL CALC-SCNC: 13 MMOL/L (ref 10–20)
AST SERPL W P-5'-P-CCNC: 22 U/L (ref 9–39)
B2 GLYCOPROT1 IGA SER-ACNC: 0.7 U/ML
B2 GLYCOPROT1 IGG SER-ACNC: <1.4 U/ML
B2 GLYCOPROT1 IGM SER-ACNC: <0.2 U/ML
BASOPHILS # BLD AUTO: 0.02 X10*3/UL (ref 0–0.1)
BASOPHILS NFR BLD AUTO: 0.3 %
BILIRUB SERPL-MCNC: 0.8 MG/DL (ref 0–1.2)
BUN SERPL-MCNC: 11 MG/DL (ref 6–23)
CALCIUM SERPL-MCNC: 9.7 MG/DL (ref 8.6–10.6)
CARDIOLIPIN IGA SERPL-ACNC: <0.5 APL U/ML
CARDIOLIPIN IGG SER IA-ACNC: <1.6 GPL U/ML
CARDIOLIPIN IGM SER IA-ACNC: <0.2 MPL U/ML
CHLORIDE SERPL-SCNC: 106 MMOL/L (ref 98–107)
CO2 SERPL-SCNC: 28 MMOL/L (ref 21–32)
CREAT SERPL-MCNC: 0.63 MG/DL (ref 0.5–1.05)
EGFRCR SERPLBLD CKD-EPI 2021: >90 ML/MIN/1.73M*2
EOSINOPHIL # BLD AUTO: 0.23 X10*3/UL (ref 0–0.7)
EOSINOPHIL NFR BLD AUTO: 3.5 %
ERYTHROCYTE [DISTWIDTH] IN BLOOD BY AUTOMATED COUNT: 13.1 % (ref 11.5–14.5)
GLUCOSE SERPL-MCNC: 88 MG/DL (ref 74–99)
HCT VFR BLD AUTO: 40.1 % (ref 36–46)
HGB BLD-MCNC: 13.4 G/DL (ref 12–16)
IMM GRANULOCYTES # BLD AUTO: 0.01 X10*3/UL (ref 0–0.7)
IMM GRANULOCYTES NFR BLD AUTO: 0.2 % (ref 0–0.9)
LDH SERPL L TO P-CCNC: 219 U/L (ref 84–246)
LYMPHOCYTES # BLD AUTO: 2.1 X10*3/UL (ref 1.2–4.8)
LYMPHOCYTES NFR BLD AUTO: 31.8 %
MAGNESIUM SERPL-MCNC: 2.44 MG/DL (ref 1.6–2.4)
MCH RBC QN AUTO: 29.4 PG (ref 26–34)
MCHC RBC AUTO-ENTMCNC: 33.4 G/DL (ref 32–36)
MCV RBC AUTO: 88 FL (ref 80–100)
MONOCYTES # BLD AUTO: 0.32 X10*3/UL (ref 0.1–1)
MONOCYTES NFR BLD AUTO: 4.8 %
NEUTROPHILS # BLD AUTO: 3.93 X10*3/UL (ref 1.2–7.7)
NEUTROPHILS NFR BLD AUTO: 59.4 %
NRBC BLD-RTO: NORMAL /100{WBCS}
PLATELET # BLD AUTO: 294 X10*3/UL (ref 150–450)
POTASSIUM SERPL-SCNC: 4.1 MMOL/L (ref 3.5–5.3)
PROT SERPL-MCNC: 7.3 G/DL (ref 6.4–8.2)
RBC # BLD AUTO: 4.56 X10*6/UL (ref 4–5.2)
SODIUM SERPL-SCNC: 143 MMOL/L (ref 136–145)
WBC # BLD AUTO: 6.6 X10*3/UL (ref 4.4–11.3)

## 2024-10-24 PROCEDURE — 81241 F5 GENE: CPT

## 2024-10-24 PROCEDURE — 85305 CLOT INHIBIT PROT S TOTAL: CPT

## 2024-10-24 PROCEDURE — 81240 F2 GENE: CPT

## 2024-10-24 PROCEDURE — 99215 OFFICE O/P EST HI 40 MIN: CPT | Performed by: INTERNAL MEDICINE

## 2024-10-24 PROCEDURE — 85025 COMPLETE CBC W/AUTO DIFF WBC: CPT

## 2024-10-24 PROCEDURE — 99205 OFFICE O/P NEW HI 60 MIN: CPT | Performed by: INTERNAL MEDICINE

## 2024-10-24 PROCEDURE — 86147 CARDIOLIPIN ANTIBODY EA IG: CPT

## 2024-10-24 PROCEDURE — 83735 ASSAY OF MAGNESIUM: CPT

## 2024-10-24 PROCEDURE — 85306 CLOT INHIBIT PROT S FREE: CPT

## 2024-10-24 PROCEDURE — 83615 LACTATE (LD) (LDH) ENZYME: CPT

## 2024-10-24 PROCEDURE — 86146 BETA-2 GLYCOPROTEIN ANTIBODY: CPT

## 2024-10-24 PROCEDURE — 85303 CLOT INHIBIT PROT C ACTIVITY: CPT

## 2024-10-24 PROCEDURE — 80053 COMPREHEN METABOLIC PANEL: CPT

## 2024-10-24 PROCEDURE — 3008F BODY MASS INDEX DOCD: CPT | Performed by: INTERNAL MEDICINE

## 2024-10-24 PROCEDURE — 85730 THROMBOPLASTIN TIME PARTIAL: CPT

## 2024-10-24 PROCEDURE — 36415 COLL VENOUS BLD VENIPUNCTURE: CPT

## 2024-10-24 PROCEDURE — 85301 ANTITHROMBIN III ANTIGEN: CPT

## 2024-10-24 PROCEDURE — 85302 CLOT INHIBIT PROT C ANTIGEN: CPT

## 2024-10-24 SDOH — HEALTH STABILITY: PHYSICAL HEALTH: ON AVERAGE, HOW MANY DAYS PER WEEK DO YOU ENGAGE IN MODERATE TO STRENUOUS EXERCISE (LIKE A BRISK WALK)?: 1 DAY

## 2024-10-24 SDOH — ECONOMIC STABILITY: FOOD INSECURITY: WITHIN THE PAST 12 MONTHS, THE FOOD YOU BOUGHT JUST DIDN'T LAST AND YOU DIDN'T HAVE MONEY TO GET MORE.: NEVER TRUE

## 2024-10-24 SDOH — ECONOMIC STABILITY: FOOD INSECURITY: WITHIN THE PAST 12 MONTHS, YOU WORRIED THAT YOUR FOOD WOULD RUN OUT BEFORE YOU GOT MONEY TO BUY MORE.: NEVER TRUE

## 2024-10-24 SDOH — ECONOMIC STABILITY: INCOME INSECURITY: IN THE LAST 12 MONTHS, WAS THERE A TIME WHEN YOU WERE NOT ABLE TO PAY THE MORTGAGE OR RENT ON TIME?: NO

## 2024-10-24 SDOH — HEALTH STABILITY: PHYSICAL HEALTH: ON AVERAGE, HOW MANY MINUTES DO YOU ENGAGE IN EXERCISE AT THIS LEVEL?: 30 MIN

## 2024-10-24 SDOH — ECONOMIC STABILITY: TRANSPORTATION INSECURITY
IN THE PAST 12 MONTHS, HAS THE LACK OF TRANSPORTATION KEPT YOU FROM MEDICAL APPOINTMENTS OR FROM GETTING MEDICATIONS?: NO

## 2024-10-24 SDOH — ECONOMIC STABILITY: GENERAL
WHICH OF THE FOLLOWING WOULD YOU LIKE TO GET CONNECTED TO IN ORDER TO RECEIVE A DISCOUNT OR FOR FREE? (CHOOSE ALL THAT APPLY): NONE OF THESE

## 2024-10-24 SDOH — ECONOMIC STABILITY: TRANSPORTATION INSECURITY
IN THE PAST 12 MONTHS, HAS LACK OF TRANSPORTATION KEPT YOU FROM MEETINGS, WORK, OR FROM GETTING THINGS NEEDED FOR DAILY LIVING?: NO

## 2024-10-24 SDOH — HEALTH STABILITY: PHYSICAL HEALTH: ON AVERAGE, HOW MANY DAYS PER WEEK DO YOU ENGAGE IN MODERATE TO STRENUOUS EXERCISE (LIKE A BRISK WALK)?: 5 DAYS

## 2024-10-24 SDOH — HEALTH STABILITY: PHYSICAL HEALTH: ON AVERAGE, HOW MANY MINUTES DO YOU ENGAGE IN EXERCISE AT THIS LEVEL?: 10 MIN

## 2024-10-24 SDOH — ECONOMIC STABILITY: GENERAL
WHICH OF THE FOLLOWING DO YOU KNOW HOW TO USE AND HAVE ACCESS TO EVERY DAY? (CHOOSE ALL THAT APPLY): DESKTOP COMPUTER, LAPTOP COMPUTER, OR TABLET WITH BROADBAND INTERNET CONNECTION;SMARTPHONE WITH CELLULAR DATA PLAN

## 2024-10-24 ASSESSMENT — LIFESTYLE VARIABLES
HOW OFTEN DO YOU HAVE A DRINK CONTAINING ALCOHOL: NEVER
HOW MANY STANDARD DRINKS CONTAINING ALCOHOL DO YOU HAVE ON A TYPICAL DAY: PATIENT DOES NOT DRINK
SKIP TO QUESTIONS 9-10: 1
AUDIT-C TOTAL SCORE: 0
HOW OFTEN DO YOU HAVE SIX OR MORE DRINKS ON ONE OCCASION: NEVER

## 2024-10-24 ASSESSMENT — SOCIAL DETERMINANTS OF HEALTH (SDOH)
DO YOU BELONG TO ANY CLUBS OR ORGANIZATIONS SUCH AS CHURCH GROUPS UNIONS, FRATERNAL OR ATHLETIC GROUPS, OR SCHOOL GROUPS?: NO
WITHIN THE LAST YEAR, HAVE TO BEEN RAPED OR FORCED TO HAVE ANY KIND OF SEXUAL ACTIVITY BY YOUR PARTNER OR EX-PARTNER?: NO
WITHIN THE LAST YEAR, HAVE YOU BEEN KICKED, HIT, SLAPPED, OR OTHERWISE PHYSICALLY HURT BY YOUR PARTNER OR EX-PARTNER?: NO
HOW OFTEN DO YOU ATTENT MEETINGS OF THE CLUB OR ORGANIZATION YOU BELONG TO?: NEVER
HOW OFTEN DO YOU GET TOGETHER WITH FRIENDS OR RELATIVES?: MORE THAN THREE TIMES A WEEK
HOW HARD IS IT FOR YOU TO PAY FOR THE VERY BASICS LIKE FOOD, HOUSING, MEDICAL CARE, AND HEATING?: NOT HARD AT ALL
IN A TYPICAL WEEK, HOW MANY TIMES DO YOU TALK ON THE PHONE WITH FAMILY, FRIENDS, OR NEIGHBORS?: MORE THAN THREE TIMES A WEEK
HOW OFTEN DO YOU ATTEND CHURCH OR RELIGIOUS SERVICES?: NEVER
IN THE PAST 12 MONTHS, HAS THE ELECTRIC, GAS, OIL, OR WATER COMPANY THREATENED TO SHUT OFF SERVICE IN YOUR HOME?: NO
WITHIN THE LAST YEAR, HAVE YOU BEEN HUMILIATED OR EMOTIONALLY ABUSED IN OTHER WAYS BY YOUR PARTNER OR EX-PARTNER?: NO
WITHIN THE LAST YEAR, HAVE YOU BEEN AFRAID OF YOUR PARTNER OR EX-PARTNER?: NO

## 2024-10-24 ASSESSMENT — PAIN SCALES - GENERAL: PAINLEVEL_OUTOF10: 0-NO PAIN

## 2024-10-24 ASSESSMENT — PATIENT HEALTH QUESTIONNAIRE - PHQ9
2. FEELING DOWN, DEPRESSED OR HOPELESS: NOT AT ALL
1. LITTLE INTEREST OR PLEASURE IN DOING THINGS: NOT AT ALL
SUM OF ALL RESPONSES TO PHQ9 QUESTIONS 1 & 2: 0

## 2024-10-24 NOTE — PROGRESS NOTES
Patient ID: Elda Anne is a 59 y.o. female.  Referring Physician: Sole Womack DO  [address]  [facility], [address]  [place] [address]  Primary Care Provider: Sole Womack DO  Visit Type: Initial Visit  The patient was referred to me for further evaluation and management of possible hypercoagulable state.  Subjective    HPI  The patient is a 59-year-old woman with past history of hypertension, asthma and left leg DVT at the age of 38.  The patient claims that she had flown to and back from [place] and presented with left leg DVT requiring admission and subsequently Coumadin for a year.  The patient claims that she was not on oral contraceptives or hormone replacement therapy when she developed left leg deep vein thrombosis.  Since then the patient has done very well.  Her father  of pulmonary embolism and another brother has protein S deficiency, 1 brother had a CVA and is on lifelong anticoagulation.  The patient is very anxious and is afraid of flying or long car rides and was referred for further evaluation and management.    At interview on 2024 denied history of weight loss, fevers, night sweats, chest pain, hemoptysis, hematemesis, melena, hematochezia and hematuria.    Past medical history:    Hypertension, bronchial asthma, history of left leg DVT at the age of 38.    Past surgical history:    Cholecystectomy in May 2024 the patient did receive DVT prophylaxis.    Mammogram 2023.    Colonoscopy:    8 years ago.    Family history father  of PE 1 brother has protein S deficiency another brother has CVA and is on lifelong anticoagulation.    Personal history and social history:    59 years old, , has 3 children Works in library denies any history of smoking alcohol abuse or drug abuse      Review of Systems   All other systems reviewed and are negative.       Objective   BSA: 1.84 meters squared  /80 Comment: manual  Pulse 58   Temp 37.1 °C  "(98.8 °F) (Temporal)   Resp 16   Ht (S) 1.687 m (5' 6.42\")   Wt 72.2 kg (159 lb 2.8 oz)   SpO2 98%   BMI 25.37 kg/m²      has a past medical history of Abnormal weight loss (08/21/2017), Allergic, Asthma ([facility]) (8 years), COVID-19 (08/25/2021), Elevated blood-pressure reading, without diagnosis of hypertension (09/11/2019), Encounter for screening for lipoid disorders (09/11/2019), Encounter for screening for lipoid disorders (08/28/2018), Eosinophilia (08/21/2017), Eosinophilic esophagitis (03/23/2022), GERD (gastroesophageal reflux disease), Headache, unspecified (10/12/2018), Headache, unspecified (03/23/2022), Hypertension (2 years), Infectious gastroenteritis and colitis, unspecified (08/25/2021), Nonscarring hair loss, unspecified (12/16/2015), Other conditions influencing health status (08/12/2021), Other malaise (09/11/2019), Palpitations (03/16/2023), Personal history of other diseases of the musculoskeletal system and connective tissue (09/11/2019), Personal history of other diseases of the nervous system and sense organs (10/12/2018), Personal history of other diseases of the respiratory system (12/02/2021), Personal history of other diseases of the respiratory system (01/16/2018), Personal history of other endocrine, nutritional and metabolic disease (01/26/2015), Personal history of other mental and behavioral disorders (10/09/2019), Personal history of other specified conditions, Personal history of other specified conditions (09/17/2018), Personal history of other specified conditions (08/21/2018), Personal history of other specified conditions (09/11/2019), Personal history of other specified conditions (09/11/2019), Personal history of other venous thrombosis and embolism (10/09/2019), Unspecified abdominal pain (05/04/2016), and Vision loss of right eye (03/16/2023).   has a past surgical history that includes Colonoscopy (01/16/2018); Esophagogastroduodenoscopy (01/16/2018); and MR angio " head wo IV contrast (9/26/2018).  Family History   Problem Relation Name Age of Onset    Other (Cardiac Disorder) Mother Mom     Hypertension Mother Mom     Lung cancer Mother Mom     Arthritis Mother Mom     Atrial fibrillation Mother Mom     Heart disease Mother Mom     Deep vein thrombosis Father Dad     Blood clot Father Dad     Clotting disorder Father Dad     Other (Cardiac Disorder) Brother      Deep vein thrombosis Brother      Hypertension Brother      Protein S deficiency Other Sibling      Oncology History    No history exists.       Elda Anne  reports that she has never smoked. She has never used smokeless tobacco.  She  reports that she does not currently use alcohol.  She  reports no history of drug use.    Physical Exam  Constitutional:       Appearance: Normal appearance.   HENT:      Head: Normocephalic and atraumatic.      Nose: Nose normal.      Mouth/Throat:      Mouth: Mucous membranes are moist.      Pharynx: Oropharynx is clear.   Eyes:      Extraocular Movements: Extraocular movements intact.      Conjunctiva/sclera: Conjunctivae normal.      Pupils: Pupils are equal, round, and reactive to light.   Cardiovascular:      Rate and Rhythm: Normal rate and regular rhythm.   Pulmonary:      Effort: Pulmonary effort is normal.      Breath sounds: Normal breath sounds.   Abdominal:      General: Abdomen is flat. Bowel sounds are normal.      Palpations: Abdomen is soft.   Musculoskeletal:         General: Normal range of motion.      Cervical back: Normal range of motion and neck supple.   Neurological:      General: No focal deficit present.      Mental Status: She is alert and oriented to person, place, and time. Mental status is at baseline.   Psychiatric:         Mood and Affect: Mood normal.         Behavior: Behavior normal.         Thought Content: Thought content normal.         Judgment: Judgment normal.         WBC   Date/Time Value Ref Range Status   11/03/2023 07:18 AM 6.1 4.4 - 11.3  x10*3/uL Final   06/13/2022 07:29 AM 5.4 4.4 - 11.3 x10E9/L Final   09/25/2019 07:27 AM 4.6 4.4 - 11.3 x10E9/L Final   08/28/2018 03:59 PM 6.0 4.4 - 11.3 x10E9/L Final     nRBC   Date Value Ref Range Status   11/03/2023 0.0 0.0 - 0.0 /100 WBCs Final   06/13/2022 0.0 0.0 - 0.0 /100 WBC Final   09/25/2019 0.0 0.0 - 0.0 /100 WBC Final   08/28/2018 0.0 0.0 - 0.0 /100 WBC Final     RBC   Date Value Ref Range Status   11/03/2023 4.89 4.00 - 5.20 x10*6/uL Final   06/13/2022 4.72 4.00 - 5.20 x10E12/L Final   09/25/2019 4.52 4.00 - 5.20 x10E12/L Final   08/28/2018 4.70 4.00 - 5.20 x10E12/L Final     Hemoglobin   Date Value Ref Range Status   11/03/2023 13.9 12.0 - 16.0 g/dL Final   06/13/2022 13.9 12.0 - 16.0 g/dL Final   09/25/2019 13.4 12.0 - 16.0 g/dL Final   08/28/2018 14.2 12.0 - 16.0 g/dL Final     Hematocrit   Date Value Ref Range Status   11/03/2023 43.3 36.0 - 46.0 % Final   06/13/2022 42.7 36.0 - 46.0 % Final   09/25/2019 40.8 36.0 - 46.0 % Final   08/28/2018 43.1 36.0 - 46.0 % Final     MCV   Date/Time Value Ref Range Status   11/03/2023 07:18 AM 89 80 - 100 fL Final   06/13/2022 07:29 AM 90 80 - 100 fL Final   09/25/2019 07:27 AM 90 80 - 100 fL Final   08/28/2018 03:59 PM 92 80 - 100 fL Final     MCH   Date/Time Value Ref Range Status   11/03/2023 07:18 AM 28.4 26.0 - 34.0 pg Final     MCHC   Date/Time Value Ref Range Status   11/03/2023 07:18 AM 32.1 32.0 - 36.0 g/dL Final   06/13/2022 07:29 AM 32.6 32.0 - 36.0 g/dL Final   09/25/2019 07:27 AM 32.8 32.0 - 36.0 g/dL Final   08/28/2018 03:59 PM 32.9 32.0 - 36.0 g/dL Final     RDW   Date/Time Value Ref Range Status   11/03/2023 07:18 AM 12.8 11.5 - 14.5 % Final   06/13/2022 07:29 AM 13.0 11.5 - 14.5 % Final   09/25/2019 07:27 AM 12.6 11.5 - 14.5 % Final   08/28/2018 03:59 PM 12.7 11.5 - 14.5 % Final     Platelets   Date/Time Value Ref Range Status   11/03/2023 07:18  150 - 450 x10*3/uL Final   06/13/2022 07:29  150 - 450 x10E9/L Final   09/25/2019 07:27  " 150 - 450 x10E9/L Final   08/28/2018 03:59  150 - 450 x10E9/L Final     No results found for: \"MPV\"  Neutrophils %   Date/Time Value Ref Range Status   08/28/2018 03:59 PM 42.8 40.0 - 80.0 % Final     Immature Granulocytes %, Automated   Date/Time Value Ref Range Status   08/28/2018 03:59 PM 0.2 0.0 - 0.9 % Final     Comment:      Percent differential counts (%) should be interpreted in the   context of the absolute cell counts (cells/L).       Lymphocytes %   Date/Time Value Ref Range Status   08/28/2018 03:59 PM 38.3 13.0 - 44.0 % Final     Monocytes %   Date/Time Value Ref Range Status   08/28/2018 03:59 PM 7.7 2.0 - 10.0 % Final     Eosinophils %   Date/Time Value Ref Range Status   08/28/2018 03:59 PM 10.3 0.0 - 6.0 % Final     Basophils %   Date/Time Value Ref Range Status   08/28/2018 03:59 PM 0.7 0.0 - 2.0 % Final     Neutrophils Absolute   Date/Time Value Ref Range Status   08/28/2018 03:59 PM 2.58 1.20 - 7.70 x10E9/L Final     No results found for: \"IGABSOL\"  Lymphocytes Absolute   Date/Time Value Ref Range Status   08/28/2018 03:59 PM 2.30 1.20 - 4.80 x10E9/L Final     Monocytes Absolute   Date/Time Value Ref Range Status   08/28/2018 03:59 PM 0.46 0.10 - 1.00 x10E9/L Final     Eosinophils Absolute   Date/Time Value Ref Range Status   08/28/2018 03:59 PM 0.62 0.00 - 0.70 x10E9/L Final     Basophils Absolute   Date/Time Value Ref Range Status   08/28/2018 03:59 PM 0.04 0.00 - 0.10 x10E9/L Final       No components found for: \"PT\"  No results found for: \"APTT\"    Assessment/Plan    The patient is a 59-year-old woman with past history of hypertension, asthma and left leg DVT at the age of 38.  The patient claims that she had flown to and back from [place] and presented with left leg DVT requiring admission and subsequently Coumadin for a year.  The patient claims that she was not on oral contraceptives or hormone replacement therapy when she developed left leg deep vein thrombosis.  Since then " the patient has done very well.  Her father  of pulmonary embolism and another brother has protein S deficiency, 1 brother had a CVA and is on lifelong anticoagulation.  The patient is very anxious and is afraid of flying or long car rides and was referred for further evaluation and management.  Physical examination within normal limits.  He had a detailed discussion with the patient explained to her about hypercoagulable state.  Since the patient has antecedent history of provoked deep vein thrombosis but a strong family history of DVT/PE have recommended further evaluation for hypercoagulable state such as protein C, protein S, Antithrombin III, factor V Leiden, prothrombin gene mutation, anticardiolipin antibody, beta-2 glycoprotein, lupus anticoagulant.  I also explained to the patient that as of now she does not have any symptoms therefore does not need to be treated.  The patient understood appreciated all the details provided and was grateful.    Thank you for allowing me to participate in care of your patient if you have any questions please feel free to call me.     Diagnoses and all orders for this visit:  History of DVT (deep vein thrombosis)  -     Referral to Hematology and Oncology  -     CBC and Auto Differential; Future  -     Comprehensive Metabolic Panel; Future  -     Anti-Cardiolipin Antibody (IgA, IgG, and IgM); Future  -     Antithrombin Antigen; Future  -     Beta-2 Glycoprotein Antibodies; Future  -     Factor V Leiden; Future  -     Lupus Anticoag. With Interpretation[Lance]; Future  -     Protein C Activity; Future  -     Protein C Antigen, Total; Future  -     Protein S Activity; Future  -     Protein S Antigen, Free; Future  -     Protein S Antigen, Total; Future  -     Prothrombin Gene Mutation Analysis; Future  -     Lactate Dehydrogenase; Future  -     Clinic Appointment Request; Future         Anthony Monte MD

## 2024-10-24 NOTE — PROGRESS NOTES
"Patient seen by Dr. Cole today in clinic. Reinforcement education provided regarding next steps with plan of care.      PER DR. COLE'S FUV NOTE TODAY: \"The patient is a 59-year-old woman with past history of hypertension, asthma and left leg DVT at the age of 38.  The patient claims that she had flown to and back from [place] and presented with left leg DVT requiring admission and subsequently Coumadin for a year.  The patient claims that she was not on oral contraceptives or hormone replacement therapy when she developed left leg deep vein thrombosis.  Since then the patient has done very well.  Her father  of pulmonary embolism and another brother has protein S deficiency, 1 brother had a CVA and is on lifelong anticoagulation.  The patient is very anxious and is afraid of flying or long car rides and was referred for further evaluation and management.  Physical examination within normal limits.  He had a detailed discussion with the patient explained to her about hypercoagulable state.  Since the patient has antecedent history of provoked deep vein thrombosis but a strong family history of DVT/PE have recommended further evaluation for hypercoagulable state such as protein C, protein S, Antithrombin III, factor V Leiden, prothrombin gene mutation, anticardiolipin antibody, beta-2 glycoprotein, lupus anticoagulant.  I also explained to the patient that as of now she does not have any symptoms therefore does not need to be treated.  The patient understood appreciated all the details provided and was grateful.\"    SDOH questions obtained as pt is a new patient today for hematology consult.  Escorted to lab for bloodwork.  Written information regarding office contact information provided.  Office tour provided. FUV 2 weeks.  Patient verbalizes understanding of plan of care via teachback method.             "

## 2024-10-26 LAB — AT III AG ACT/NOR PPP IA: 100 % (ref 82–136)

## 2024-10-27 LAB
PROT C AG ACT/NOR PPP IA: >95 % (ref 63–153)
PROT S AG ACT/NOR PPP IA: 77 % (ref 63–126)

## 2024-10-28 LAB
DRVVT SCREEN TO CONFIRM RATIO: 0.89 RATIO
DRVVT/DRVVT CFM NRMLZD PPP-RTO: 0.94 RATIO
DRVVT/DRVVT CFM P DOAC NEUT NORM PPP-RTO: 0.94 RATIO
ELECTRONICALLY SIGNED BY: NORMAL
ELECTRONICALLY SIGNED BY: NORMAL
FACTOR II-PROTHROMBIN INTERPRETATION: NORMAL
FACTOR II-PROTHROMBIN RESULT: NORMAL
FACTOR V LEIDEN INTERPRETATION: NORMAL
FACTOR V LEIDEN RESULT: NORMAL
LA 2 SCREEN W REFLEX-IMP: NORMAL
NORMALIZED SCT PPP-RTO: 0.84 RATIO
SILICA CLOTTING TIME CONFIRMATION: 0.96 RATIO
SILICA CLOTTING TIME SCREEN: 0.81 RATIO

## 2024-10-29 LAB
PROT C ACT/NOR PPP CHRO: 130 % ACTIVITY (ref 70–150)
PROT S ACT/NOR PPP: 34 % ACTIVITY (ref 64–150)
PROT S FREE AG ACT/NOR PPP IA: 13 % (ref 55–124)

## 2024-11-07 ENCOUNTER — OFFICE VISIT (OUTPATIENT)
Dept: HEMATOLOGY/ONCOLOGY | Facility: CLINIC | Age: 59
End: 2024-11-07
Payer: COMMERCIAL

## 2024-11-07 VITALS
BODY MASS INDEX: 26.14 KG/M2 | WEIGHT: 164.02 LBS | OXYGEN SATURATION: 98 % | HEART RATE: 64 BPM | TEMPERATURE: 98.4 F | RESPIRATION RATE: 16 BRPM | SYSTOLIC BLOOD PRESSURE: 147 MMHG | DIASTOLIC BLOOD PRESSURE: 75 MMHG

## 2024-11-07 DIAGNOSIS — Z86.718 HISTORY OF DVT (DEEP VEIN THROMBOSIS): ICD-10-CM

## 2024-11-07 PROCEDURE — 99214 OFFICE O/P EST MOD 30 MIN: CPT | Performed by: INTERNAL MEDICINE

## 2024-11-07 ASSESSMENT — PAIN SCALES - GENERAL: PAINLEVEL_OUTOF10: 0-NO PAIN

## 2024-11-07 NOTE — PROGRESS NOTES
Follow-up on an as needed basis.  Call back instructions reviewed.  Patient verbalized understanding.

## 2024-11-07 NOTE — PROGRESS NOTES
Patient ID: Elda Anne is a 59 y.o. female.  Referring Physician: Anthony Monte MD  [address] [facility]  [place] [id number]  Primary Care Provider: Sole Womack DO  Visit Type: Initial Visit  Total protein S   77  Protein S free     13  Protein   S activity 34  The patient was referred to me for further evaluation and management of possible hypercoagulable state.  Subjective    HPI  The patient is a 59-year-old woman with past history of hypertension, asthma and left leg DVT at the age of 38.  The patient claims that she had flown to and back from [place] and presented with left leg DVT requiring admission and subsequently Coumadin for a year.  The patient claims that she was not on oral contraceptives or hormone replacement therapy when she developed left leg deep vein thrombosis.  Since then the patient has done very well.  Her father  of pulmonary embolism and another brother has protein S deficiency, 1 brother had a CVA and is on lifelong anticoagulation.  The patient is very anxious and is afraid of flying or long car rides and was referred for further evaluation and management.    At interview on 2024 denied history of weight loss, fevers, night sweats, chest pain, hemoptysis, hematemesis, melena, hematochezia and hematuria.    Patient had come for follow-up on 2024 regarding history of deep vein thrombosis 20 years ago.  Today, the patient is asymptomatic.      Past medical history:    Hypertension, bronchial asthma, history of left leg DVT at the age of 38.    Past surgical history:    Cholecystectomy in May 2024 the patient did receive DVT prophylaxis.    Mammogram 2023.    Colonoscopy:    8 years ago.    Family history father  of PE 1 brother has protein S deficiency another brother has CVA and is on lifelong anticoagulation.    Personal history and social history:    59 years old, , has 3 children Works in library denies any  history of smoking alcohol abuse or drug abuse      Review of Systems   All other systems reviewed and are negative.       Objective   BSA: 1.87 meters squared  /75   Pulse 64   Temp 36.9 °C (98.4 °F) (Temporal)   Resp 16   Wt 74.4 kg (164 lb 0.4 oz)   SpO2 98%   BMI 26.14 kg/m²      has a past medical history of Abnormal weight loss (08/21/2017), Allergic, Asthma ([facility]) (8 years), COVID-19 (08/25/2021), Elevated blood-pressure reading, without diagnosis of hypertension (09/11/2019), Encounter for screening for lipoid disorders (09/11/2019), Encounter for screening for lipoid disorders (08/28/2018), Eosinophilia (08/21/2017), Eosinophilic esophagitis (03/23/2022), GERD (gastroesophageal reflux disease), Headache, unspecified (10/12/2018), Headache, unspecified (03/23/2022), Hypertension (2 years), Infectious gastroenteritis and colitis, unspecified (08/25/2021), Nonscarring hair loss, unspecified (12/16/2015), Other conditions influencing health status (08/12/2021), Other malaise (09/11/2019), Palpitations (03/16/2023), Personal history of other diseases of the musculoskeletal system and connective tissue (09/11/2019), Personal history of other diseases of the nervous system and sense organs (10/12/2018), Personal history of other diseases of the respiratory system (12/02/2021), Personal history of other diseases of the respiratory system (01/16/2018), Personal history of other endocrine, nutritional and metabolic disease (01/26/2015), Personal history of other mental and behavioral disorders (10/09/2019), Personal history of other specified conditions, Personal history of other specified conditions (09/17/2018), Personal history of other specified conditions (08/21/2018), Personal history of other specified conditions (09/11/2019), Personal history of other specified conditions (09/11/2019), Personal history of other venous thrombosis and embolism (10/09/2019), Unspecified abdominal pain  (05/04/2016), and Vision loss of right eye (03/16/2023).   has a past surgical history that includes Colonoscopy (01/16/2018); Esophagogastroduodenoscopy (01/16/2018); and MR angio head wo IV contrast (9/26/2018).  Family History   Problem Relation Name Age of Onset    Other (Cardiac Disorder) Mother Mom     Hypertension Mother Mom     Lung cancer Mother Mom     Arthritis Mother Mom     Atrial fibrillation Mother Mom     Heart disease Mother Mom     Deep vein thrombosis Father Dad     Blood clot Father Dad     Clotting disorder Father Dad     Other (Cardiac Disorder) Brother      Deep vein thrombosis Brother      Hypertension Brother      Protein S deficiency Other Sibling      Oncology History    No history exists.       Elda Anne  reports that she has never smoked. She has never used smokeless tobacco.  She  reports that she does not currently use alcohol.  She  reports no history of drug use.    Physical Exam  Constitutional:       Appearance: Normal appearance.   HENT:      Head: Normocephalic and atraumatic.      Nose: Nose normal.      Mouth/Throat:      Mouth: Mucous membranes are moist.      Pharynx: Oropharynx is clear.   Eyes:      Extraocular Movements: Extraocular movements intact.      Conjunctiva/sclera: Conjunctivae normal.      Pupils: Pupils are equal, round, and reactive to light.   Cardiovascular:      Rate and Rhythm: Normal rate and regular rhythm.   Pulmonary:      Effort: Pulmonary effort is normal.      Breath sounds: Normal breath sounds.   Abdominal:      General: Abdomen is flat. Bowel sounds are normal.      Palpations: Abdomen is soft.   Musculoskeletal:         General: Normal range of motion.      Cervical back: Normal range of motion and neck supple.   Neurological:      General: No focal deficit present.      Mental Status: She is alert and oriented to person, place, and time. Mental status is at baseline.   Psychiatric:         Mood and Affect: Mood normal.         Behavior:  Behavior normal.         Thought Content: Thought content normal.         Judgment: Judgment normal.         WBC   Date/Time Value Ref Range Status   10/24/2024 01:09 PM 6.6 4.4 - 11.3 x10*3/uL Final   11/03/2023 07:18 AM 6.1 4.4 - 11.3 x10*3/uL Final   06/13/2022 07:29 AM 5.4 4.4 - 11.3 x10E9/L Final   09/25/2019 07:27 AM 4.6 4.4 - 11.3 x10E9/L Final   08/28/2018 03:59 PM 6.0 4.4 - 11.3 x10E9/L Final     nRBC   Date Value Ref Range Status   10/24/2024   Final     Comment:     Not Measured   11/03/2023 0.0 0.0 - 0.0 /100 WBCs Final   06/13/2022 0.0 0.0 - 0.0 /100 WBC Final   09/25/2019 0.0 0.0 - 0.0 /100 WBC Final   08/28/2018 0.0 0.0 - 0.0 /100 WBC Final     RBC   Date Value Ref Range Status   10/24/2024 4.56 4.00 - 5.20 x10*6/uL Final   11/03/2023 4.89 4.00 - 5.20 x10*6/uL Final   06/13/2022 4.72 4.00 - 5.20 x10E12/L Final   09/25/2019 4.52 4.00 - 5.20 x10E12/L Final   08/28/2018 4.70 4.00 - 5.20 x10E12/L Final     Hemoglobin   Date Value Ref Range Status   10/24/2024 13.4 12.0 - 16.0 g/dL Final   11/03/2023 13.9 12.0 - 16.0 g/dL Final   06/13/2022 13.9 12.0 - 16.0 g/dL Final   09/25/2019 13.4 12.0 - 16.0 g/dL Final   08/28/2018 14.2 12.0 - 16.0 g/dL Final     Hematocrit   Date Value Ref Range Status   10/24/2024 40.1 36.0 - 46.0 % Final   11/03/2023 43.3 36.0 - 46.0 % Final   06/13/2022 42.7 36.0 - 46.0 % Final   09/25/2019 40.8 36.0 - 46.0 % Final   08/28/2018 43.1 36.0 - 46.0 % Final     MCV   Date/Time Value Ref Range Status   10/24/2024 01:09 PM 88 80 - 100 fL Final   11/03/2023 07:18 AM 89 80 - 100 fL Final   06/13/2022 07:29 AM 90 80 - 100 fL Final   09/25/2019 07:27 AM 90 80 - 100 fL Final   08/28/2018 03:59 PM 92 80 - 100 fL Final     MCH   Date/Time Value Ref Range Status   10/24/2024 01:09 PM 29.4 26.0 - 34.0 pg Final   11/03/2023 07:18 AM 28.4 26.0 - 34.0 pg Final     MCHC   Date/Time Value Ref Range Status   10/24/2024 01:09 PM 33.4 32.0 - 36.0 g/dL Final   11/03/2023 07:18 AM 32.1 32.0 - 36.0 g/dL  "Final   06/13/2022 07:29 AM 32.6 32.0 - 36.0 g/dL Final   09/25/2019 07:27 AM 32.8 32.0 - 36.0 g/dL Final   08/28/2018 03:59 PM 32.9 32.0 - 36.0 g/dL Final     RDW   Date/Time Value Ref Range Status   10/24/2024 01:09 PM 13.1 11.5 - 14.5 % Final   11/03/2023 07:18 AM 12.8 11.5 - 14.5 % Final   06/13/2022 07:29 AM 13.0 11.5 - 14.5 % Final   09/25/2019 07:27 AM 12.6 11.5 - 14.5 % Final   08/28/2018 03:59 PM 12.7 11.5 - 14.5 % Final     Platelets   Date/Time Value Ref Range Status   10/24/2024 01:09  150 - 450 x10*3/uL Final   11/03/2023 07:18  150 - 450 x10*3/uL Final   06/13/2022 07:29  150 - 450 x10E9/L Final   09/25/2019 07:27  150 - 450 x10E9/L Final   08/28/2018 03:59  150 - 450 x10E9/L Final     No results found for: \"MPV\"  Neutrophils %   Date/Time Value Ref Range Status   10/24/2024 01:09 PM 59.4 40.0 - 80.0 % Final   08/28/2018 03:59 PM 42.8 40.0 - 80.0 % Final     Immature Granulocytes %, Automated   Date/Time Value Ref Range Status   10/24/2024 01:09 PM 0.2 0.0 - 0.9 % Final     Comment:     Immature Granulocyte Count (IG) includes promyelocytes, myelocytes and metamyelocytes but does not include bands. Percent differential counts (%) should be interpreted in the context of the absolute cell counts (cells/UL).   08/28/2018 03:59 PM 0.2 0.0 - 0.9 % Final     Comment:      Percent differential counts (%) should be interpreted in the   context of the absolute cell counts (cells/L).       Lymphocytes %   Date/Time Value Ref Range Status   10/24/2024 01:09 PM 31.8 13.0 - 44.0 % Final   08/28/2018 03:59 PM 38.3 13.0 - 44.0 % Final     Monocytes %   Date/Time Value Ref Range Status   10/24/2024 01:09 PM 4.8 2.0 - 10.0 % Final   08/28/2018 03:59 PM 7.7 2.0 - 10.0 % Final     Eosinophils %   Date/Time Value Ref Range Status   10/24/2024 01:09 PM 3.5 0.0 - 6.0 % Final   08/28/2018 03:59 PM 10.3 0.0 - 6.0 % Final     Basophils %   Date/Time Value Ref Range Status   10/24/2024 01:09 PM 0.3 " "0.0 - 2.0 % Final   2018 03:59 PM 0.7 0.0 - 2.0 % Final     Neutrophils Absolute   Date/Time Value Ref Range Status   10/24/2024 01:09 PM 3.93 1.20 - 7.70 x10*3/uL Final     Comment:     Percent differential counts (%) should be interpreted in the context of the absolute cell counts (cells/uL).   2018 03:59 PM 2.58 1.20 - 7.70 x10E9/L Final     Immature Granulocytes Absolute, Automated   Date/Time Value Ref Range Status   10/24/2024 01:09 PM 0.01 0.00 - 0.70 x10*3/uL Final     Lymphocytes Absolute   Date/Time Value Ref Range Status   10/24/2024 01:09 PM 2.10 1.20 - 4.80 x10*3/uL Final   2018 03:59 PM 2.30 1.20 - 4.80 x10E9/L Final     Monocytes Absolute   Date/Time Value Ref Range Status   10/24/2024 01:09 PM 0.32 0.10 - 1.00 x10*3/uL Final   2018 03:59 PM 0.46 0.10 - 1.00 x10E9/L Final     Eosinophils Absolute   Date/Time Value Ref Range Status   10/24/2024 01:09 PM 0.23 0.00 - 0.70 x10*3/uL Final   2018 03:59 PM 0.62 0.00 - 0.70 x10E9/L Final     Basophils Absolute   Date/Time Value Ref Range Status   10/24/2024 01:09 PM 0.02 0.00 - 0.10 x10*3/uL Final   2018 03:59 PM 0.04 0.00 - 0.10 x10E9/L Final       No components found for: \"PT\"  No results found for: \"APTT\"    Assessment/Plan    The patient is a 59-year-old woman with past history of hypertension, asthma and left leg DVT at the age of 38.  The patient claims that she had flown to and back from [place] and presented with left leg DVT requiring admission and subsequently Coumadin for a year.  The patient claims that she was not on oral contraceptives or hormone replacement therapy when she developed left leg deep vein thrombosis.  Since then the patient has done very well.  Her father  of pulmonary embolism and another brother has protein S deficiency, 1 brother had a CVA and is on lifelong anticoagulation.  The patient is very anxious and is afraid of flying or long car rides and was referred for further evaluation " and management.  Physical examination within normal limits.  He had a detailed discussion with the patient explained to her about hypercoagulable state.  Since the patient has antecedent history of provoked deep vein thrombosis but a strong family history of DVT/PE have recommended further evaluation for hypercoagulable state such as protein C, protein S, Antithrombin III, factor V Leiden, prothrombin gene mutation, anticardiolipin antibody, beta-2 glycoprotein, lupus anticoagulant.  I also explained to the patient that as of now she does not have any symptoms therefore does not need to be treated.  The patient understood appreciated all the details provided and was grateful.    Patient had come for follow-up on November 7, 2024 regarding history of deep vein thrombosis 20 years ago.  Today, the patient is asymptomatic.  Total protein is 77, free protein S 13, protein S activity 34 suggestive of protein S deficiency.  The patient had blood clot long time ago and has done well for last 20 years.  Recommend low pressure elastic stockings, increase mobility, and siblings and family need to be tested for the same.  Return as needed.    Thank you for allowing me to participate in care of your patient if you have any questions please feel free to call me.     Diagnoses and all orders for this visit:  History of DVT (deep vein thrombosis)  -     Referral to Hematology and Oncology  -     CBC and Auto Differential; Future  -     Comprehensive Metabolic Panel; Future  -     Anti-Cardiolipin Antibody (IgA, IgG, and IgM); Future  -     Antithrombin Antigen; Future  -     Beta-2 Glycoprotein Antibodies; Future  -     Factor V Leiden; Future  -     Lupus Anticoag. With Interpretation[Lance]; Future  -     Protein C Activity; Future  -     Protein C Antigen, Total; Future  -     Protein S Activity; Future  -     Protein S Antigen, Free; Future  -     Protein S Antigen, Total; Future  -     Prothrombin Gene Mutation Analysis;  Future  -     Lactate Dehydrogenase; Future  -     Clinic Appointment Request; Future         Anthony Monte MD

## 2024-11-13 ENCOUNTER — PATIENT MESSAGE (OUTPATIENT)
Dept: HEMATOLOGY/ONCOLOGY | Facility: CLINIC | Age: 59
End: 2024-11-13

## 2024-11-13 ENCOUNTER — APPOINTMENT (OUTPATIENT)
Dept: PRIMARY CARE | Facility: CLINIC | Age: 59
End: 2024-11-13
Payer: COMMERCIAL

## 2024-11-13 VITALS
HEART RATE: 57 BPM | OXYGEN SATURATION: 98 % | SYSTOLIC BLOOD PRESSURE: 150 MMHG | DIASTOLIC BLOOD PRESSURE: 68 MMHG | BODY MASS INDEX: 26.19 KG/M2 | RESPIRATION RATE: 16 BRPM | TEMPERATURE: 98.6 F | WEIGHT: 164.3 LBS

## 2024-11-13 DIAGNOSIS — J45.20 MILD INTERMITTENT ASTHMA WITHOUT COMPLICATION (HHS-HCC): ICD-10-CM

## 2024-11-13 DIAGNOSIS — I10 BENIGN ESSENTIAL HYPERTENSION: Primary | Chronic | ICD-10-CM

## 2024-11-13 DIAGNOSIS — E53.8 VITAMIN B12 DEFICIENCY: ICD-10-CM

## 2024-11-13 DIAGNOSIS — R79.89 ELEVATED HOMOCYSTEINE: ICD-10-CM

## 2024-11-13 DIAGNOSIS — J45.909 ASTHMA, UNSPECIFIED ASTHMA SEVERITY, UNSPECIFIED WHETHER COMPLICATED, UNSPECIFIED WHETHER PERSISTENT (HHS-HCC): ICD-10-CM

## 2024-11-13 PROCEDURE — 1036F TOBACCO NON-USER: CPT | Performed by: FAMILY MEDICINE

## 2024-11-13 PROCEDURE — 90656 IIV3 VACC NO PRSV 0.5 ML IM: CPT | Performed by: FAMILY MEDICINE

## 2024-11-13 PROCEDURE — 99214 OFFICE O/P EST MOD 30 MIN: CPT | Performed by: FAMILY MEDICINE

## 2024-11-13 PROCEDURE — 3077F SYST BP >= 140 MM HG: CPT | Performed by: FAMILY MEDICINE

## 2024-11-13 PROCEDURE — 3078F DIAST BP <80 MM HG: CPT | Performed by: FAMILY MEDICINE

## 2024-11-13 PROCEDURE — 90471 IMMUNIZATION ADMIN: CPT | Performed by: FAMILY MEDICINE

## 2024-11-13 RX ORDER — ALBUTEROL SULFATE 90 UG/1
2 INHALANT RESPIRATORY (INHALATION) EVERY 4 HOURS PRN
Qty: 25.5 G | Refills: 0 | Status: SHIPPED | OUTPATIENT
Start: 2024-11-13

## 2024-11-13 ASSESSMENT — PATIENT HEALTH QUESTIONNAIRE - PHQ9
SUM OF ALL RESPONSES TO PHQ9 QUESTIONS 1 AND 2: 0
2. FEELING DOWN, DEPRESSED OR HOPELESS: NOT AT ALL
1. LITTLE INTEREST OR PLEASURE IN DOING THINGS: NOT AT ALL

## 2024-11-13 ASSESSMENT — ENCOUNTER SYMPTOMS
COLOR CHANGE: 1
WOUND: 1

## 2024-11-13 NOTE — PROGRESS NOTES
Subjective   Patient ID: Elda Anne is a 59 y.o. female who presents for HTN.     HPI   HTN  The patient presents for follow up of hypertension.   Patient denies symptoms including headaches, dizziness, vision changes, syncope, chest pain, palpitations, dyspnea, and edema.   Medications are being taken as directed and tolerated well without side effects: not currently taking medication  Blood pressure is monitored outside the office. At home, her BP was 127 and 134 on top.   The patient reports adherence to a low salt diet and is getting regular exercise.  Appt with cardiologist is scheduled for 12/19    Protein S deficiency  Patient had a blood clot 20 years ago      Pending appt with GI    Derm- had a skin lesion frozen, but wound has not healed. Cryo was performed in September. (), next appt in March    B12 deficiency and elevated homocysteine level (MTHFR mutation)      Review of Systems   Skin:  Positive for color change and wound.   All other systems reviewed and are negative.    Objective   /68 (BP Location: Right arm, Patient Position: Sitting, BP Cuff Size: Adult)   Pulse 57   Temp 37 °C (98.6 °F) (Temporal)   Resp 16   Wt 74.5 kg (164 lb 4.8 oz)   SpO2 98%   BMI 26.19 kg/m²     Physical Exam  Constitutional: Well developed, well nourished, alert and in no acute distress   Eyes: Normal external exam.   Cardiovascular: Regular rate and rhythm, normal S1 and S2, no murmurs, gallops, or rubs. Radial pulses normal. No peripheral edema.  Pulmonary: No respiratory distress, lungs clear to auscultation bilaterally. No wheezes, rhonchi, rales.  Skin: Warm, well perfused, normal skin turgor, +erythematous firm, scarred tissue on posterior RLE.  Neurologic: Cranial nerves II-XII grossly intact.   Psychiatric: Mood calm and affect normal.    Assessment/Plan   Hypertension (High Blood Pressure)  -elevated   -follow a low sodium diet  -limit stress, alcohol, tobacco and caffeine as much as  possible    It is important to control Blood Pressure for reducing risk of stroke, heart attack, kidney damage, and circulatory problems from clogged arteries.  Advised new blood pressure goals based on evidence from recent studies showed blood pressure should be less than 130/80.   Check your BP at least weekly and If your BP is above this goal on repeated measurements, please contact us so we can make treatment adjustments.  Diet recommendations - reduce sodium intake (less than 2,400 mg/day), follow DASH diet, increase exercise (150 minutes per week), lose weight (Goal BMI < 25).   Generally recommend follow up at least every 6 months.    If you are less than 60 years old, have diabetes mellitus, or chronic kidney disease, your goal blood pressure is < 140/90.  If you are older than 60 years old and do not have diabetes or kidney disease, your goal blood pressure is < 150/90.    Pending Cardiology appt    Discussed obtaining a home BP cuff and bringing in BP log to next appointment in 3 weeks, please bring in home machine for use to validate.         Follow up after Dec 19th

## 2024-11-13 NOTE — PROGRESS NOTES
Xavi Schroeder  Per Dr Monte's note he is recommending low pressure compression stockings. You would wear these during the day.  The compression would be either 15-20mmHg or 10-30mmHg.    Thanks    Daniel

## 2024-12-06 ENCOUNTER — APPOINTMENT (OUTPATIENT)
Dept: PRIMARY CARE | Facility: CLINIC | Age: 59
End: 2024-12-06
Payer: COMMERCIAL

## 2024-12-10 PROBLEM — I47.10 PAROXYSMAL SUPRAVENTRICULAR TACHYCARDIA (CMS-HCC): Status: ACTIVE | Noted: 2024-12-10

## 2024-12-10 PROBLEM — R05.9 COUGH: Status: ACTIVE | Noted: 2024-12-10

## 2024-12-10 PROBLEM — R79.89 ELEVATED LIVER FUNCTION TESTS: Status: ACTIVE | Noted: 2024-05-11

## 2024-12-10 PROBLEM — K80.20 CALCULUS OF GALLBLADDER WITHOUT CHOLECYSTITIS WITHOUT OBSTRUCTION: Status: ACTIVE | Noted: 2024-05-11

## 2024-12-10 PROBLEM — J98.4 CALCIFIED GRANULOMA OF LUNG: Status: ACTIVE | Noted: 2024-12-10

## 2024-12-19 ENCOUNTER — OFFICE VISIT (OUTPATIENT)
Dept: CARDIOLOGY | Facility: CLINIC | Age: 59
End: 2024-12-19
Payer: COMMERCIAL

## 2024-12-19 VITALS
HEIGHT: 67 IN | SYSTOLIC BLOOD PRESSURE: 140 MMHG | BODY MASS INDEX: 25.74 KG/M2 | WEIGHT: 164 LBS | DIASTOLIC BLOOD PRESSURE: 62 MMHG | OXYGEN SATURATION: 99 % | HEART RATE: 60 BPM

## 2024-12-19 DIAGNOSIS — Z86.718 HISTORY OF DVT (DEEP VEIN THROMBOSIS): Primary | ICD-10-CM

## 2024-12-19 DIAGNOSIS — I10 PRIMARY HYPERTENSION: ICD-10-CM

## 2024-12-19 PROCEDURE — 3008F BODY MASS INDEX DOCD: CPT | Performed by: INTERNAL MEDICINE

## 2024-12-19 PROCEDURE — 3078F DIAST BP <80 MM HG: CPT | Performed by: INTERNAL MEDICINE

## 2024-12-19 PROCEDURE — 99203 OFFICE O/P NEW LOW 30 MIN: CPT | Performed by: INTERNAL MEDICINE

## 2024-12-19 PROCEDURE — 99213 OFFICE O/P EST LOW 20 MIN: CPT | Performed by: INTERNAL MEDICINE

## 2024-12-19 PROCEDURE — 3077F SYST BP >= 140 MM HG: CPT | Performed by: INTERNAL MEDICINE

## 2024-12-19 NOTE — PATIENT INSTRUCTIONS
Monitor your blood pressure once a week - goal <130/80 mmHg.    Cut down on salt intake, weight loss, and exercise can decrease blood pressure by about 10 mmHg or so.    You have intrinsically low heart rate - ok to monitor with a smart watch.    When you do flights, do baby aspirin and walk every 1 hour.

## 2024-12-19 NOTE — PROGRESS NOTES
PCP: Sole Womack, DO      Subjective   Elda Anne is a 59 y.o. female who complains of borderline HTN . Previously seen by [facility] 2022 for palpitations.    Has intermittent high BP.  Daily having palpitation clusters. Was seen previously for paroxysmal SVT.  Patient complains of cramping along the left breast - can occur for no reason at all. Can be triggered by reaching for something in car. Can occur in R side as well.    Prior studies from [facility] old notes:  CT calcium score: 0   Echo: EF 60-65% with a normal LV systolic function  Holter monitor: NSR with a min HR of 39,. max HR is 125, average HR is 60  Brief episodes of SVT    Had a DVT at age of 38 with a short flight to [place]. Has protein S deficiency.    +FHx phlebitis dad, valve replacement/PPM/HTN in mom.      Review of Systems:  Otherwise, limited cardiovascular review of systems is negative.    Past Medical History:  She has a past medical history of Abnormal weight loss (08/21/2017), Allergic, Asthma (8 years), COVID-19 (08/25/2021), Elevated blood-pressure reading, without diagnosis of hypertension (09/11/2019), Encounter for screening for lipoid disorders (09/11/2019), Encounter for screening for lipoid disorders (08/28/2018), Eosinophilia (08/21/2017), Eosinophilic esophagitis (03/23/2022), GERD (gastroesophageal reflux disease), Headache, unspecified (10/12/2018), Headache, unspecified (03/23/2022), Hypertension (2 years), Infectious gastroenteritis and colitis, unspecified (08/25/2021), Nonscarring hair loss, unspecified (12/16/2015), Other conditions influencing health status (08/12/2021), Other malaise (09/11/2019), Palpitations (03/16/2023), Personal history of other diseases of the musculoskeletal system and connective tissue (09/11/2019), Personal history of other diseases of the nervous system and sense organs (10/12/2018), Personal history of other diseases of the respiratory system (12/02/2021), Personal history of other  diseases of the respiratory system (01/16/2018), Personal history of other endocrine, nutritional and metabolic disease (01/26/2015), Personal history of other mental and behavioral disorders (10/09/2019), Personal history of other specified conditions, Personal history of other specified conditions (09/17/2018), Personal history of other specified conditions (08/21/2018), Personal history of other specified conditions (09/11/2019), Personal history of other specified conditions (09/11/2019), Personal history of other venous thrombosis and embolism (10/09/2019), Unspecified abdominal pain (05/04/2016), and Vision loss of right eye (03/16/2023).    Surgical History:   She has a past surgical history that includes Colonoscopy (01/16/2018); Esophagogastroduodenoscopy (01/16/2018); and MR angio head wo IV contrast (9/26/2018).    Family History:   Family History   Problem Relation Name Age of Onset    Other (Cardiac Disorder) Mother Mom     Hypertension Mother Mom     Lung cancer Mother Mom     Arthritis Mother Mom     Atrial fibrillation Mother Mom     Heart disease Mother Mom     Deep vein thrombosis Father Dad     Blood clot Father Dad     Clotting disorder Father Dad     Other (Cardiac Disorder) Brother      Deep vein thrombosis Brother      Hypertension Brother      Protein S deficiency Other Sibling        Social History:   Tobacco Use: Low Risk  (11/13/2024)    Patient History     Smoking Tobacco Use: Never     Smokeless Tobacco Use: Never     Passive Exposure: Not on file       Outpatient Medications:    Current Outpatient Medications:     albuterol 90 mcg/actuation inhaler, Inhale 2 puffs every 4 hours if needed for wheezing., Disp: 25.5 g, Rfl: 0    budesonide-formoteroL (Symbicort) 160-4.5 mcg/actuation inhaler, Inhale 2 puffs twice a day., Disp: , Rfl:     cholecalciferol (Vitamin D-3) 50 MCG (2000 UT) tablet, Take 1 tablet (2,000 Units) by mouth once daily., Disp: , Rfl:     fluticasone (Flonase) 50  "mcg/actuation nasal spray, 2 sprays by Does not apply route once daily., Disp: , Rfl:     ibuprofen 600 mg tablet, Take 200 mg by mouth., Disp: , Rfl:     hydrOXYzine HCL (Atarax) 10 mg tablet, Take 1 tablet (10 mg) by mouth 1 time if needed for itching., Disp: 30 tablet, Rfl: 0     Allergies:  Cat dander, House dust, and Pollen extracts       Objective   Vital Signs:  /62 (BP Location: Left arm, Patient Position: Sitting, BP Cuff Size: Adult)   Pulse 60   Ht 1.702 m (5' 7\")   Wt 74.4 kg (164 lb)   SpO2 99%   BMI 25.69 kg/m²     Physical Exam:  General: no acute distress  HEENT: EOMI, no scleral icterus.  Lungs: Clear to auscultation bilaterally without wheezing, rales, or rhonchi.  Cardiovascular: Regular rhythm and rate. Normal S1 and S2. No murmurs, rubs, or gallops are appreciated. JVP normal.  no bruits  Abdomen: Soft, nontender, nondistended. Bowel sounds present.  Extremities: Warm and well perfused with equal 2+ pulses bilaterally.  No edema present.  Neurologic: Alert and oriented x3.    Pertinent Recent Cardiovascular Studies (personally reviewed):    Cardiac studies:  Echocardiogram 2022: normal and reviewed by myself    Laboratory values:  CMP:  Recent Labs     10/24/24  1309 09/17/24  1553 11/03/23  0718 06/13/22  0729 09/25/19  0727 08/28/18  1559     --  142 142 143 140   K 4.1  --  4.7 4.5 4.2 4.9     --  107 107 106 103   CO2 28  --  27 26 26 31   ANIONGAP 13  --  13 14 15 11   BUN 11  --  15 13 12 11   CREATININE 0.63  --  0.86 0.76 0.62 0.71   EGFR >90  --  78  --   --   --    MG 2.44* 2.48* 2.52*  --   --   --      Recent Labs     10/24/24  1309 11/03/23  0718 06/13/22  0729 09/25/19  0727 08/28/18  1559   ALBUMIN 4.6 4.3 4.3 4.4 4.9   ALKPHOS 96 97 95 76 64   ALT 22 17 20 13 16   AST 22 17 16 18 18   BILITOT 0.8 0.7 1.0 1.0 0.9     CBC:  Recent Labs     10/24/24  1309 11/03/23  0718 06/13/22  0729 09/25/19  0727 08/28/18  1559   WBC 6.6 6.1 5.4 4.6 6.0   HGB 13.4 13.9 13.9 " 13.4 14.2   HCT 40.1 43.3 42.7 40.8 43.1    326 306 288 308   MCV 88 89 90 90 92     HEME/ENDO:  Recent Labs     09/17/24  1553 11/03/23  0718 06/13/22  0729 09/25/19  0727 08/28/18  1559   FERRITIN  --   --  56  --   --    TSH  --  2.38  --  1.48 1.07   HGBA1C 5.3 5.6  --   --   --       CARDIAC:   Recent Labs     10/24/24  1309        Recent Labs     11/03/23  0718 06/13/22  0729 09/25/19  0727 08/28/18  1559   CHOL 159 177 168 192   LDLF 83 103* 87 98   HDL 58.2 59.2 68.5 75.1   TRIG 88 73 65 96     MICRO:   Recent Labs     09/17/24  1553 09/25/19 0727 08/28/18  1559   CRP 0.27 <0.10 <0.10        I have personally reviewed most recent PCP, cardiology, vascular, and/or podiatry documentation.      Assessment/Plan   59 y.o. female with  borderline HTN and SVT with palpitations .    ASCVD 10 year risk 2.6% based on cholesterol panel 2023 and current BP.    Plan:  Patient would like to try lifestyle modification.  Defer to PCP if hydrochlorothiazide is indicated after modification.    Prevention for DVT during flights discussed.  Follow up as needed.         Mikel Nava MD, FACC, FSCAI, RPVI  Co-Director, Vascular Center, and  Co-Director, Pulmonary Embolism Response Team,   [facility] [employer]                                 of Medicine,                                                                 [employer]

## 2025-02-27 ENCOUNTER — TELEPHONE (OUTPATIENT)
Dept: PRIMARY CARE | Facility: CLINIC | Age: 60
End: 2025-02-27
Payer: COMMERCIAL

## 2025-02-27 DIAGNOSIS — R35.0 URINARY FREQUENCY: Primary | ICD-10-CM

## 2025-02-27 NOTE — TELEPHONE ENCOUNTER
Patient called in she is having UTI symptoms and wondering if you would just order her a Urine test.   Please call her back if it is ordered

## 2025-02-27 NOTE — TELEPHONE ENCOUNTER
Orders are in, however, if she is negative for a UTI she will need to schedule with me for evaluation

## 2025-03-01 LAB
APPEARANCE UR: CLEAR
BACTERIA UR CULT: NORMAL
BILIRUB UR QL STRIP: NEGATIVE
COLOR UR: YELLOW
GLUCOSE UR QL STRIP: NEGATIVE
HGB UR QL STRIP: NEGATIVE
KETONES UR QL STRIP: NEGATIVE
LEUKOCYTE ESTERASE UR QL STRIP: NEGATIVE
NITRITE UR QL STRIP: NEGATIVE
PH UR STRIP: 6.5 [PH] (ref 5–8)
PROT UR QL STRIP: NEGATIVE
SP GR UR STRIP: 1.01 (ref 1–1.03)

## 2025-04-02 ENCOUNTER — OFFICE (OUTPATIENT)
Dept: URBAN - METROPOLITAN AREA CLINIC 26 | Facility: CLINIC | Age: 60
End: 2025-04-02
Payer: COMMERCIAL

## 2025-04-02 VITALS
SYSTOLIC BLOOD PRESSURE: 156 MMHG | DIASTOLIC BLOOD PRESSURE: 81 MMHG | DIASTOLIC BLOOD PRESSURE: 77 MMHG | SYSTOLIC BLOOD PRESSURE: 143 MMHG | TEMPERATURE: 98.2 F | HEIGHT: 67 IN | HEART RATE: 85 BPM | WEIGHT: 170 LBS

## 2025-04-02 DIAGNOSIS — R10.33 PERIUMBILICAL PAIN: ICD-10-CM

## 2025-04-02 DIAGNOSIS — K52.9 NONINFECTIVE GASTROENTERITIS AND COLITIS, UNSPECIFIED: ICD-10-CM

## 2025-04-02 DIAGNOSIS — Z87.19 PERSONAL HISTORY OF OTHER DISEASES OF THE DIGESTIVE SYSTEM: ICD-10-CM

## 2025-04-02 DIAGNOSIS — Z86.0100 PERSONAL HISTORY OF COLON POLYPS, UNSPECIFIED: ICD-10-CM

## 2025-04-02 DIAGNOSIS — R14.0 ABDOMINAL DISTENSION (GASEOUS): ICD-10-CM

## 2025-04-02 DIAGNOSIS — R10.13 EPIGASTRIC PAIN: ICD-10-CM

## 2025-04-02 DIAGNOSIS — R13.10 DYSPHAGIA, UNSPECIFIED: ICD-10-CM

## 2025-04-02 DIAGNOSIS — R10.32 LEFT LOWER QUADRANT PAIN: ICD-10-CM

## 2025-04-02 DIAGNOSIS — R19.7 DIARRHEA, UNSPECIFIED: ICD-10-CM

## 2025-04-02 DIAGNOSIS — R11.0 NAUSEA: ICD-10-CM

## 2025-04-02 PROCEDURE — 99214 OFFICE O/P EST MOD 30 MIN: CPT | Performed by: CLINICAL NURSE SPECIALIST

## 2025-04-02 RX ORDER — POLYETHYLENE GLYCOL-3350 AND ELECTROLYTES 236; 6.74; 5.86; 2.97; 22.74 G/274.31G; G/274.31G; G/274.31G; G/274.31G; G/274.31G
POWDER, FOR SOLUTION ORAL
Qty: 4000 | Refills: 0 | Status: ACTIVE
Start: 2025-04-02

## 2025-04-25 ENCOUNTER — AMBULATORY SURGICAL CENTER (OUTPATIENT)
Dept: URBAN - METROPOLITAN AREA SURGERY 12 | Facility: SURGERY | Age: 60
End: 2025-04-25
Payer: COMMERCIAL

## 2025-04-25 ENCOUNTER — OFFICE (OUTPATIENT)
Dept: URBAN - METROPOLITAN AREA PATHOLOGY 2 | Facility: PATHOLOGY | Age: 60
End: 2025-04-25
Payer: COMMERCIAL

## 2025-04-25 VITALS
DIASTOLIC BLOOD PRESSURE: 47 MMHG | DIASTOLIC BLOOD PRESSURE: 54 MMHG | DIASTOLIC BLOOD PRESSURE: 53 MMHG | WEIGHT: 165 LBS | RESPIRATION RATE: 21 BRPM | RESPIRATION RATE: 27 BRPM | DIASTOLIC BLOOD PRESSURE: 56 MMHG | SYSTOLIC BLOOD PRESSURE: 117 MMHG | SYSTOLIC BLOOD PRESSURE: 116 MMHG | SYSTOLIC BLOOD PRESSURE: 161 MMHG | DIASTOLIC BLOOD PRESSURE: 50 MMHG | DIASTOLIC BLOOD PRESSURE: 46 MMHG | HEART RATE: 65 BPM | RESPIRATION RATE: 22 BRPM | HEART RATE: 52 BPM | DIASTOLIC BLOOD PRESSURE: 52 MMHG | HEART RATE: 52 BPM | DIASTOLIC BLOOD PRESSURE: 41 MMHG | SYSTOLIC BLOOD PRESSURE: 143 MMHG | RESPIRATION RATE: 22 BRPM | DIASTOLIC BLOOD PRESSURE: 46 MMHG | SYSTOLIC BLOOD PRESSURE: 116 MMHG | OXYGEN SATURATION: 98 % | HEART RATE: 33 BPM | HEART RATE: 57 BPM | RESPIRATION RATE: 24 BRPM | RESPIRATION RATE: 20 BRPM | SYSTOLIC BLOOD PRESSURE: 102 MMHG | SYSTOLIC BLOOD PRESSURE: 100 MMHG | HEART RATE: 64 BPM | DIASTOLIC BLOOD PRESSURE: 63 MMHG | SYSTOLIC BLOOD PRESSURE: 143 MMHG | SYSTOLIC BLOOD PRESSURE: 86 MMHG | HEART RATE: 56 BPM | DIASTOLIC BLOOD PRESSURE: 56 MMHG | OXYGEN SATURATION: 97 % | RESPIRATION RATE: 21 BRPM | DIASTOLIC BLOOD PRESSURE: 48 MMHG | HEART RATE: 64 BPM | HEART RATE: 63 BPM | OXYGEN SATURATION: 96 % | SYSTOLIC BLOOD PRESSURE: 161 MMHG | RESPIRATION RATE: 15 BRPM | RESPIRATION RATE: 17 BRPM | RESPIRATION RATE: 9 BRPM | SYSTOLIC BLOOD PRESSURE: 143 MMHG | SYSTOLIC BLOOD PRESSURE: 102 MMHG | TEMPERATURE: 98.1 F | RESPIRATION RATE: 17 BRPM | DIASTOLIC BLOOD PRESSURE: 41 MMHG | HEART RATE: 90 BPM | DIASTOLIC BLOOD PRESSURE: 50 MMHG | RESPIRATION RATE: 23 BRPM | SYSTOLIC BLOOD PRESSURE: 90 MMHG | HEART RATE: 69 BPM | HEART RATE: 57 BPM | RESPIRATION RATE: 17 BRPM | HEART RATE: 61 BPM | RESPIRATION RATE: 20 BRPM | TEMPERATURE: 98.1 F | RESPIRATION RATE: 13 BRPM | DIASTOLIC BLOOD PRESSURE: 48 MMHG | OXYGEN SATURATION: 96 % | HEART RATE: 90 BPM | HEART RATE: 62 BPM | HEART RATE: 69 BPM | SYSTOLIC BLOOD PRESSURE: 112 MMHG | HEART RATE: 90 BPM | DIASTOLIC BLOOD PRESSURE: 52 MMHG | DIASTOLIC BLOOD PRESSURE: 64 MMHG | DIASTOLIC BLOOD PRESSURE: 48 MMHG | DIASTOLIC BLOOD PRESSURE: 46 MMHG | SYSTOLIC BLOOD PRESSURE: 112 MMHG | DIASTOLIC BLOOD PRESSURE: 54 MMHG | DIASTOLIC BLOOD PRESSURE: 64 MMHG | WEIGHT: 165 LBS | RESPIRATION RATE: 11 BRPM | RESPIRATION RATE: 21 BRPM | HEART RATE: 65 BPM | WEIGHT: 165 LBS | RESPIRATION RATE: 9 BRPM | SYSTOLIC BLOOD PRESSURE: 90 MMHG | DIASTOLIC BLOOD PRESSURE: 56 MMHG | DIASTOLIC BLOOD PRESSURE: 63 MMHG | SYSTOLIC BLOOD PRESSURE: 131 MMHG | SYSTOLIC BLOOD PRESSURE: 100 MMHG | TEMPERATURE: 98.1 F | RESPIRATION RATE: 27 BRPM | DIASTOLIC BLOOD PRESSURE: 53 MMHG | HEART RATE: 69 BPM | HEART RATE: 33 BPM | SYSTOLIC BLOOD PRESSURE: 131 MMHG | SYSTOLIC BLOOD PRESSURE: 117 MMHG | HEART RATE: 52 BPM | RESPIRATION RATE: 15 BRPM | RESPIRATION RATE: 22 BRPM | RESPIRATION RATE: 15 BRPM | HEIGHT: 67 IN | SYSTOLIC BLOOD PRESSURE: 117 MMHG | DIASTOLIC BLOOD PRESSURE: 47 MMHG | HEART RATE: 56 BPM | SYSTOLIC BLOOD PRESSURE: 107 MMHG | RESPIRATION RATE: 20 BRPM | RESPIRATION RATE: 13 BRPM | SYSTOLIC BLOOD PRESSURE: 131 MMHG | OXYGEN SATURATION: 98 % | HEART RATE: 64 BPM | HEART RATE: 61 BPM | HEART RATE: 57 BPM | SYSTOLIC BLOOD PRESSURE: 161 MMHG | HEART RATE: 62 BPM | HEART RATE: 63 BPM | DIASTOLIC BLOOD PRESSURE: 41 MMHG | SYSTOLIC BLOOD PRESSURE: 102 MMHG | RESPIRATION RATE: 13 BRPM | DIASTOLIC BLOOD PRESSURE: 63 MMHG | HEART RATE: 33 BPM | DIASTOLIC BLOOD PRESSURE: 50 MMHG | SYSTOLIC BLOOD PRESSURE: 107 MMHG | OXYGEN SATURATION: 97 % | HEIGHT: 67 IN | DIASTOLIC BLOOD PRESSURE: 54 MMHG | OXYGEN SATURATION: 96 % | SYSTOLIC BLOOD PRESSURE: 107 MMHG | DIASTOLIC BLOOD PRESSURE: 52 MMHG | HEART RATE: 62 BPM | HEART RATE: 63 BPM | DIASTOLIC BLOOD PRESSURE: 53 MMHG | SYSTOLIC BLOOD PRESSURE: 116 MMHG | RESPIRATION RATE: 11 BRPM | SYSTOLIC BLOOD PRESSURE: 112 MMHG | RESPIRATION RATE: 14 BRPM | SYSTOLIC BLOOD PRESSURE: 86 MMHG | OXYGEN SATURATION: 98 % | HEART RATE: 56 BPM | RESPIRATION RATE: 23 BRPM | RESPIRATION RATE: 24 BRPM | RESPIRATION RATE: 23 BRPM | SYSTOLIC BLOOD PRESSURE: 100 MMHG | OXYGEN SATURATION: 97 % | DIASTOLIC BLOOD PRESSURE: 64 MMHG | HEIGHT: 67 IN | RESPIRATION RATE: 24 BRPM | RESPIRATION RATE: 14 BRPM | HEART RATE: 61 BPM | SYSTOLIC BLOOD PRESSURE: 90 MMHG | RESPIRATION RATE: 27 BRPM | RESPIRATION RATE: 14 BRPM | DIASTOLIC BLOOD PRESSURE: 47 MMHG | RESPIRATION RATE: 11 BRPM | SYSTOLIC BLOOD PRESSURE: 86 MMHG | HEART RATE: 65 BPM | RESPIRATION RATE: 9 BRPM

## 2025-04-25 DIAGNOSIS — K63.89 OTHER SPECIFIED DISEASES OF INTESTINE: ICD-10-CM

## 2025-04-25 DIAGNOSIS — R10.32 LEFT LOWER QUADRANT PAIN: ICD-10-CM

## 2025-04-25 DIAGNOSIS — K64.8 OTHER HEMORRHOIDS: ICD-10-CM

## 2025-04-25 DIAGNOSIS — R19.7 DIARRHEA, UNSPECIFIED: ICD-10-CM

## 2025-04-25 DIAGNOSIS — Z86.0100 PERSONAL HISTORY OF COLON POLYPS, UNSPECIFIED: ICD-10-CM

## 2025-04-25 DIAGNOSIS — K57.30 DIVERTICULOSIS OF LARGE INTESTINE WITHOUT PERFORATION OR ABS: ICD-10-CM

## 2025-04-25 PROCEDURE — 88305 TISSUE EXAM BY PATHOLOGIST: CPT | Performed by: PATHOLOGY

## 2025-04-25 PROCEDURE — 45380 COLONOSCOPY AND BIOPSY: CPT | Performed by: INTERNAL MEDICINE

## 2025-04-25 PROCEDURE — 88342 IMHCHEM/IMCYTCHM 1ST ANTB: CPT | Performed by: PATHOLOGY

## 2025-04-25 PROCEDURE — 88313 SPECIAL STAINS GROUP 2: CPT | Performed by: PATHOLOGY

## 2025-05-14 ENCOUNTER — APPOINTMENT (OUTPATIENT)
Dept: PRIMARY CARE | Facility: CLINIC | Age: 60
End: 2025-05-14
Payer: COMMERCIAL

## 2025-05-14 VITALS
TEMPERATURE: 97.3 F | WEIGHT: 172.5 LBS | HEIGHT: 67 IN | RESPIRATION RATE: 14 BRPM | OXYGEN SATURATION: 98 % | SYSTOLIC BLOOD PRESSURE: 167 MMHG | HEART RATE: 57 BPM | DIASTOLIC BLOOD PRESSURE: 94 MMHG | BODY MASS INDEX: 27.07 KG/M2

## 2025-05-14 DIAGNOSIS — Z00.00 ENCOUNTER FOR WELLNESS EXAMINATION IN ADULT: Primary | ICD-10-CM

## 2025-05-14 DIAGNOSIS — E53.8 VITAMIN B12 DEFICIENCY: ICD-10-CM

## 2025-05-14 DIAGNOSIS — R06.83 SNORING: ICD-10-CM

## 2025-05-14 DIAGNOSIS — F51.04 PSYCHOPHYSIOLOGICAL INSOMNIA: ICD-10-CM

## 2025-05-14 DIAGNOSIS — G47.19 DAYTIME HYPERSOMNOLENCE: ICD-10-CM

## 2025-05-14 DIAGNOSIS — E66.3 OVERWEIGHT WITH BODY MASS INDEX (BMI) OF 27 TO 27.9 IN ADULT: ICD-10-CM

## 2025-05-14 DIAGNOSIS — R25.2 CRAMP IN MUSCLE: ICD-10-CM

## 2025-05-14 DIAGNOSIS — Z12.31 SCREENING MAMMOGRAM FOR BREAST CANCER: ICD-10-CM

## 2025-05-14 DIAGNOSIS — Z86.718 HISTORY OF DVT (DEEP VEIN THROMBOSIS): ICD-10-CM

## 2025-05-14 DIAGNOSIS — H57.89 EYE SWELLING, BILATERAL: Chronic | ICD-10-CM

## 2025-05-14 DIAGNOSIS — I47.10 PAROXYSMAL SUPRAVENTRICULAR TACHYCARDIA: ICD-10-CM

## 2025-05-14 PROBLEM — R05.9 COUGH: Status: RESOLVED | Noted: 2024-12-10 | Resolved: 2025-05-14

## 2025-05-14 PROCEDURE — 1036F TOBACCO NON-USER: CPT | Performed by: FAMILY MEDICINE

## 2025-05-14 PROCEDURE — 3008F BODY MASS INDEX DOCD: CPT | Performed by: FAMILY MEDICINE

## 2025-05-14 PROCEDURE — 99396 PREV VISIT EST AGE 40-64: CPT | Performed by: FAMILY MEDICINE

## 2025-05-14 PROCEDURE — 99214 OFFICE O/P EST MOD 30 MIN: CPT | Performed by: FAMILY MEDICINE

## 2025-05-14 RX ORDER — BUSPIRONE HYDROCHLORIDE 10 MG/1
10 TABLET ORAL NIGHTLY PRN
Qty: 30 TABLET | Refills: 0 | Status: SHIPPED | OUTPATIENT
Start: 2025-05-14 | End: 2025-06-13

## 2025-05-14 NOTE — PROGRESS NOTES
Subjective   Patient ID: Elda Anne is a 59 y.o. female who presents for Annual Exam (The patient was made aware that AI (artificial intelligence) technology will be utilized during today's visit. The patient expressed understanding and verbally accepts the use of AI technology.  ).  History of Present Illness  Due for Tdap and shingrix series     Elda Anne is a 59 year old female with hypertension and asthma who presents for a well check and to discuss multiple ongoing health concerns.    Eye discomfort  She has persistent ocular symptoms including swelling, redness, and inflammation, which have not responded to treatments such as Pataday, cortisone, and cortisone with antibiotics. These symptoms have prevented her from wearing contacts or makeup for over a year. She also experiences dry mouth and occasional eye twitching. She has a history of positive MARILEE tests but no definitive autoimmune diagnosis. An upcoming rheumatology appointment is scheduled. She has been evaluated by ophthalmology and an allergist already without a diagnosis or improvement in symptoms. She reports dry eyes and dry mouth but no prior diagnosis of sicca or sjrogen's.- Her symptoms are very bothersome and distressing.    HTN  Her hypertension is managed with home blood pressure readings showing significantly lower values than those recorded in the office. She averages 120/130s systolically and 70s diastolically. BP machine was validated today in the office.    She recently underwent a colonoscopy with a recommendation to repeat in five years. She is due for a mammogram, with the last one conducted in December 2023.    H/o Blood clot  She has Protein S deficiency, diagnosed after a blood clot approximately 20 years ago. She has been evaluated by hematology and is not currently on a blood thinner.     Cramps  She experiences frequent cramping in her calves, feet, and hands without specific triggers. Despite high magnesium levels, she is  "not on magnesium supplements. She is concerned about the cramping and the fact that it changes location and is becoming more common and extensive.     Her asthma has worsened, requiring two puffs of her long-term inhaler twice daily and albuterol use approximately twice a week. She has not taken Singulair due to concerns about potential side effects.    She experiences anxiety and previously used hydroxyzine, which she discontinued due to its drying effects. She would like something for insomnia induced by anxiety/stress.     Review of Systems  ROS otherwise negative aside from what was mentioned above in HPI.    Objective     BP (!) 167/94 (BP Location: Right arm, Patient Position: Sitting, BP Cuff Size: Adult)   Pulse 57   Temp 36.3 °C (97.3 °F) (Temporal)   Resp 14   Ht 1.702 m (5' 7\")   Wt 78.2 kg (172 lb 8 oz)   SpO2 98%   BMI 27.02 kg/m²      Current Outpatient Medications   Medication Instructions    albuterol 90 mcg/actuation inhaler 2 puffs, inhalation, Every 4 hours PRN    budesonide-formoteroL (Symbicort) 160-4.5 mcg/actuation inhaler 2 puffs, 2 times daily    busPIRone (BUSPAR) 10 mg, oral, Nightly PRN    cholecalciferol (VITAMIN D-3) 2,000 Units, Daily    fluticasone (Flonase) 50 mcg/actuation nasal spray 2 sprays, Daily RT    hydrOXYzine HCL (ATARAX) 10 mg, oral, Once as needed    ibuprofen 200 mg       Physical Exam  Constitutional: Well developed, well nourished, alert and in no acute distress.  Head and Face: Normocephalic, atraumatic.  Eyes: Normal external exam. Pupils equally round and reactive to light with normal accommodation and extraocular movements intact. No external abnormality appreciated, no swelling or conjunctival injection.   ENT: External inspection of ears normal, tympanic membranes visualized and normal. Nasal mucosa, septum, and turbinates normal. Oral mucosa moist, oropharynx clear.   Neck: Supple, no lymphadenopathy or masses. Thyroid not enlarged, no palpable nodules. "   Cardiovascular: Regular rate and rhythm, normal S1 and S2, no murmurs, gallops, or rubs. Radial pulses normal. No peripheral edema. No carotid bruits.   Pulmonary: No respiratory distress, lungs clear to auscultation bilaterally. No wheezes, rhonchi, rales.   Breasts: declined   Abdomen: Soft, nontender, nondistended, normal bowel sounds. No masses palpated.   Musculoskeletal: Gait normal. Muscle strength/tone normal of all 4 extremities. Normal range of motion of all extremities.   Skin: Warm, well perfused, normal skin turgor and color, no lesions or rashes noted.   Neurologic: Cranial nerves II-XII grossly intact. Deep tendon reflexes were 2+ and symmetric. Sensation normal bilaterally.   Psychiatric: Mood calm and affect normal.      Results  DIAGNOSTIC  Colonoscopy: Repeat in five years (04/30/2025)    Assessment & Plan  Eye inflammation and swelling  Chronic eye inflammation and swelling with redness and burning sensation. Previous treatments with allergy eye drops, cortisone, and antibiotics have been ineffective. Differential diagnosis includes Sjogren's syndrome, thyroid issues, or allergies. Consideration of sleep apnea as a potential cause of eye symptoms.  - Keep rheumatology appointment for further evaluation of potential Sjogren's syndrome  - Consider home sleep study to evaluate for sleep apnea as a potential cause of eye symptoms    Hypertension  Blood pressure readings have been variable, with recent readings of 172/73, 167/94, and 158/82. Home readings are reportedly lower. Blood pressure machine was validated on May 4. She has been less consistent with lifestyle modifications since February.  - Encourage lifestyle modifications to manage blood pressure    Asthma  Asthma control has worsened, requiring increased use of long-term inhaler (two puffs twice a day). Albuterol use is approximately twice a week. She is hesitant to use Singulair due to concerns about side effects.  - Continue current  asthma management with long-term inhaler  - Discuss potential use of Singulair if asthma control does not improve    Muscle cramping and twitching  Experiencing progressive muscle cramping and twitching in various body parts. Concerns about potential neurological causes. High magnesium levels noted previously. Differential includes electrolyte imbalance or nerve-related issues.  - Order blood work including CK levels, inflammatory markers, PTH, calcium, vitamin D, magnesium, and homocysteine  - Consider nerve conduction study or EMG if blood work is inconclusive    Anxiety  Anxiety managed with hydroxyzine as needed, but she desires an alternative due to its drying effects. Interested in Buspar for occasional use to manage anxiety and stress.  - Prescribe Buspar 10 mg for occasional use, with option to start at 5 mg    Protein S deficiency  Protein S deficiency, a blood clotting disorder. Blood clot occurred 20 years ago. Advised to be cautious with surgeries and travel due to risk of clotting. No current anticoagulation therapy required unless another clot occurs.  - Advise caution with surgeries and travel to prevent clotting    General Health Maintenance  Overdue for mammogram since last performed in December 2023. Colonoscopy performed recently with recommendation to repeat in five years.  - Order mammogram    Sole Womack DO     This medical note was created with the assistance of artificial intelligence (AI) for documentation purposes. The content has been reviewed and confirmed by the healthcare provider for accuracy and completeness. Patient consented to the use of audio recording and use of AI during their visit.

## 2025-06-13 ENCOUNTER — CLINICAL SUPPORT (OUTPATIENT)
Dept: SLEEP MEDICINE | Facility: HOSPITAL | Age: 60
End: 2025-06-13
Payer: COMMERCIAL

## 2025-06-13 DIAGNOSIS — R06.83 SNORING: ICD-10-CM

## 2025-06-13 DIAGNOSIS — G47.19 DAYTIME HYPERSOMNOLENCE: ICD-10-CM

## 2025-06-13 PROCEDURE — 95806 SLEEP STUDY UNATT&RESP EFFT: CPT | Performed by: PSYCHIATRY & NEUROLOGY

## 2025-06-18 ENCOUNTER — HOSPITAL ENCOUNTER (OUTPATIENT)
Dept: RADIOLOGY | Facility: CLINIC | Age: 60
Discharge: HOME | End: 2025-06-18
Payer: COMMERCIAL

## 2025-06-18 VITALS — HEIGHT: 67 IN | BODY MASS INDEX: 27 KG/M2 | WEIGHT: 172 LBS

## 2025-06-18 DIAGNOSIS — Z12.31 SCREENING MAMMOGRAM FOR BREAST CANCER: ICD-10-CM

## 2025-06-18 PROCEDURE — 77063 BREAST TOMOSYNTHESIS BI: CPT

## 2025-06-18 PROCEDURE — 77063 BREAST TOMOSYNTHESIS BI: CPT | Performed by: RADIOLOGY

## 2025-06-18 PROCEDURE — 77067 SCR MAMMO BI INCL CAD: CPT | Performed by: RADIOLOGY

## 2025-06-28 LAB
25(OH)D3+25(OH)D2 SERPL-MCNC: 28 NG/ML (ref 30–100)
CHOLEST SERPL-MCNC: 171 MG/DL
CHOLEST/HDLC SERPL: 2.5 (CALC)
CK BB CFR SERPL ELPH: NORMAL %
CK ISOS SERPL-IMP: NORMAL
CK MB CFR SERPL ELPH: NORMAL %
CK MM CFR SERPL ELPH: NORMAL %
CK SERPL-CCNC: NORMAL U/L
CRP SERPL-MCNC: NORMAL MG/L
ERYTHROCYTE [SEDIMENTATION RATE] IN BLOOD BY WESTERGREN METHOD: 14 MM/H
EST. AVERAGE GLUCOSE BLD GHB EST-MCNC: 120 MG/DL
EST. AVERAGE GLUCOSE BLD GHB EST-SCNC: 6.6 MMOL/L
HBA1C MFR BLD: 5.8 %
HCYS SERPL-SCNC: NORMAL UMOL/L
HDLC SERPL-MCNC: 69 MG/DL
LDLC SERPL CALC-MCNC: 83 MG/DL (CALC)
MAGNESIUM SERPL-MCNC: 2.5 MG/DL (ref 1.5–2.5)
NONHDLC SERPL-MCNC: 102 MG/DL (CALC)
PTH-INTACT SERPL-MCNC: 66 PG/ML (ref 16–77)
TRIGL SERPL-MCNC: 92 MG/DL
VIT B12 SERPL-MCNC: 425 PG/ML (ref 200–1100)

## 2025-06-30 LAB
25(OH)D3+25(OH)D2 SERPL-MCNC: 28 NG/ML (ref 30–100)
CHOLEST SERPL-MCNC: 171 MG/DL
CHOLEST/HDLC SERPL: 2.5 (CALC)
CK BB CFR SERPL ELPH: NORMAL %
CK ISOS SERPL-IMP: NORMAL
CK MB CFR SERPL ELPH: 0 %
CK MM CFR SERPL ELPH: 100 % (ref 95–100)
CK SERPL-CCNC: 125 U/L (ref 21–240)
CRP SERPL-MCNC: 3.1 MG/L
ERYTHROCYTE [SEDIMENTATION RATE] IN BLOOD BY WESTERGREN METHOD: 14 MM/H
EST. AVERAGE GLUCOSE BLD GHB EST-MCNC: 120 MG/DL
EST. AVERAGE GLUCOSE BLD GHB EST-SCNC: 6.6 MMOL/L
HBA1C MFR BLD: 5.8 %
HCYS SERPL-SCNC: 11.5 UMOL/L
HDLC SERPL-MCNC: 69 MG/DL
LDLC SERPL CALC-MCNC: 83 MG/DL (CALC)
MAGNESIUM SERPL-MCNC: 2.5 MG/DL (ref 1.5–2.5)
NONHDLC SERPL-MCNC: 102 MG/DL (CALC)
PTH-INTACT SERPL-MCNC: 66 PG/ML (ref 16–77)
TRIGL SERPL-MCNC: 92 MG/DL
VIT B12 SERPL-MCNC: 425 PG/ML (ref 200–1100)

## 2025-07-19 DIAGNOSIS — J45.20 MILD INTERMITTENT ASTHMA WITHOUT COMPLICATION (HHS-HCC): ICD-10-CM

## 2025-07-19 DIAGNOSIS — J45.909 ASTHMA, UNSPECIFIED ASTHMA SEVERITY, UNSPECIFIED WHETHER COMPLICATED, UNSPECIFIED WHETHER PERSISTENT (HHS-HCC): ICD-10-CM

## 2025-07-21 RX ORDER — ALBUTEROL SULFATE 90 UG/1
2 INHALANT RESPIRATORY (INHALATION) EVERY 4 HOURS PRN
Qty: 25.5 G | Refills: 0 | Status: SHIPPED | OUTPATIENT
Start: 2025-07-21

## 2025-07-25 ENCOUNTER — APPOINTMENT (OUTPATIENT)
Dept: PRIMARY CARE | Facility: CLINIC | Age: 60
End: 2025-07-25
Payer: COMMERCIAL

## 2025-07-25 DIAGNOSIS — E55.9 VITAMIN D DEFICIENCY: ICD-10-CM

## 2025-07-25 DIAGNOSIS — G47.33 OSA (OBSTRUCTIVE SLEEP APNEA): Primary | ICD-10-CM

## 2025-07-25 DIAGNOSIS — R73.03 PREDIABETES: ICD-10-CM

## 2025-07-25 PROCEDURE — 1036F TOBACCO NON-USER: CPT | Performed by: FAMILY MEDICINE

## 2025-07-25 PROCEDURE — 99214 OFFICE O/P EST MOD 30 MIN: CPT | Performed by: FAMILY MEDICINE

## 2025-07-25 NOTE — PROGRESS NOTES
Subjective   Patient ID: Elda Anne is a 60 y.o. female who presents for Review Sleep Study Results. I have communicated my name and active licensure. The patient's identity and physical location were verified at the time of this visit. Either the patient or their legal representative has been informed of the risks and benefits of, and alternatives to, treatment through a remote evaluation and consents to proceed with the evaluation remotely.   The patient was made aware that AI (artificial intelligence) technology will be utilized during today's visit. The patient expressed understanding and verbally accepts the use of AI technology.     HPI   History of Present Illness  Elda Anne is a 60 year old female who presents for follow-up after a home sleep study.    She underwent a home sleep study which revealed mild sleep apnea, with 9.5 apneic or hypopneic episodes per hour while supine and 7.1 episodes per hour while on her side. Her oxygen levels dropped to 83.7% at one point, but she spent only 0.6 minutes below 90% oxygen saturation. She intentionally slept on her back during the study as per the instructions, although she is usually a side sleeper. She did not snore during the study, although her  reports that she snores at home.    She is concerned about her low resting heart rate, which was noted to be in the thirties during the sleep study. She has been aware of her low heart rate for some time and does not experience symptoms like fainting or dizziness. She has previously seen a cardiologist who did not find it concerning. She is worried about the potential impact of medications on her heart rate, particularly Buspar, which she has been using as needed for anxiety.    She has a history of autoimmune conditions and asthma, which contribute to her feeling unwell. She is currently taking vitamin D supplements, although her levels were noted to be low. She is also taking B12 and methylfolate  supplements, which have helped normalize her homocysteine levels. Her A1c was noted to be 5.8.    Review of Systems   All other systems reviewed and are negative.    Objective   There were no vitals taken for this visit.    Physical Exam  Constitutional: Well developed, well nourished, alert and in no acute distress   Eyes: Normal external exam.   Pulmonary: No respiratory distress  Neurologic: Cranial nerves II-XII grossly intact.   Psychiatric: Mood calm and affect normal.    Assessment/Plan   Assessment & Plan  Mild Obstructive Sleep Apnea  Diagnosed with mild obstructive sleep apnea following a home sleep study, showing 9.5 apneic or hypopneic episodes per hour supine and 7.1 episodes per hour on her side. Oxygen levels dropped to 83.7% at the lowest, with only 0.6 minutes below 90% saturation. No significant snoring or central sleep apnea. Discussed CPAP therapy benefits, including improved energy, mood, and reduced inflammation, aiding autoimmune conditions. CPAP devices are now less bulky and more comfortable, with options like nasal pillows and masks. Insurance may cover costs with compliance. Sleep is crucial for reducing inflammation and supporting the immune system.  - Order CPAP trial with a range of 5 to 20 cm H2O pressure  - Coordinate with insurance and supply company for CPAP fitting and trial    Prediabetes  A1c level at 5.8 indicates prediabetes. Sleep apnea can exacerbate prediabetes due to increased nocturnal cortisol levels. Emphasized the importance of sleep in managing prediabetes.  -continue current medications  -follow a low sugar, low carb diet  -schedule annual diabetic eye exam  -exercise as tolerated, goal of 30 minutes x 5days/week  -stay hydrated with water  -refer to American Di[employer] Association for more information      Bradycardia  - Repeat EKG at next visit in September    Vitamin D Deficiency  Recent blood work showed low vitamin D levels. Currently taking a calcium supplement  with vitamin D but plans to switch to a supplement that includes vitamin K once her current supply runs out.  - Repeat vitamin D level before next visit in September      Keep appointment in September

## 2025-08-13 ENCOUNTER — HOSPITAL ENCOUNTER (OUTPATIENT)
Dept: RADIOLOGY | Facility: CLINIC | Age: 60
Discharge: HOME | End: 2025-08-13
Payer: COMMERCIAL

## 2025-08-13 DIAGNOSIS — R10.13 EPIGASTRIC PAIN: ICD-10-CM

## 2025-08-13 DIAGNOSIS — R10.33 PERIUMBILICAL PAIN: ICD-10-CM

## 2025-08-13 DIAGNOSIS — R19.7 DIARRHEA, UNSPECIFIED: ICD-10-CM

## 2025-08-13 DIAGNOSIS — R13.10 DYSPHAGIA, UNSPECIFIED: ICD-10-CM

## 2025-08-13 DIAGNOSIS — K52.9 NONINFECTIVE GASTROENTERITIS AND COLITIS, UNSPECIFIED: ICD-10-CM

## 2025-08-13 DIAGNOSIS — R10.32 LEFT LOWER QUADRANT PAIN: ICD-10-CM

## 2025-08-13 DIAGNOSIS — Z87.19 PERSONAL HISTORY OF OTHER DISEASES OF THE DIGESTIVE SYSTEM: ICD-10-CM

## 2025-08-13 DIAGNOSIS — R11.0 NAUSEA: ICD-10-CM

## 2025-08-13 PROCEDURE — 74177 CT ABD & PELVIS W/CONTRAST: CPT

## 2025-08-13 PROCEDURE — 2550000001 HC RX 255 CONTRASTS

## 2025-08-13 PROCEDURE — 74177 CT ABD & PELVIS W/CONTRAST: CPT | Performed by: STUDENT IN AN ORGANIZED HEALTH CARE EDUCATION/TRAINING PROGRAM

## 2025-08-13 RX ORDER — DIATRIZOATE MEGLUMINE AND DIATRIZOATE SODIUM 660; 100 MG/ML; MG/ML
30 SOLUTION ORAL; RECTAL ONCE
Status: COMPLETED | OUTPATIENT
Start: 2025-08-13 | End: 2025-08-13

## 2025-08-13 RX ADMIN — DIATRIZOATE MEGLUMINE AND DIATRIZOATE SODIUM 30 ML: 660; 100 LIQUID ORAL; RECTAL at 15:01

## 2025-08-13 RX ADMIN — IOHEXOL 75 ML: 350 INJECTION, SOLUTION INTRAVENOUS at 15:01

## 2025-09-03 ENCOUNTER — APPOINTMENT (OUTPATIENT)
Dept: PRIMARY CARE | Facility: CLINIC | Age: 60
End: 2025-09-03
Payer: COMMERCIAL

## 2025-09-03 ASSESSMENT — ENCOUNTER SYMPTOMS
PALPITATIONS: 1
HYPERTENSION: 1
WHEEZING: 0

## 2026-01-02 ENCOUNTER — APPOINTMENT (OUTPATIENT)
Dept: PRIMARY CARE | Facility: CLINIC | Age: 61
End: 2026-01-02
Payer: COMMERCIAL